# Patient Record
Sex: MALE | Race: WHITE | NOT HISPANIC OR LATINO | Employment: FULL TIME | URBAN - METROPOLITAN AREA
[De-identification: names, ages, dates, MRNs, and addresses within clinical notes are randomized per-mention and may not be internally consistent; named-entity substitution may affect disease eponyms.]

---

## 2017-04-25 ENCOUNTER — MYC MEDICAL ADVICE (OUTPATIENT)
Dept: FAMILY MEDICINE | Facility: CLINIC | Age: 52
End: 2017-04-25

## 2017-04-26 ENCOUNTER — OFFICE VISIT (OUTPATIENT)
Dept: FAMILY MEDICINE | Facility: CLINIC | Age: 52
End: 2017-04-26

## 2017-04-26 ENCOUNTER — TELEPHONE (OUTPATIENT)
Dept: FAMILY MEDICINE | Facility: CLINIC | Age: 52
End: 2017-04-26

## 2017-04-26 VITALS
SYSTOLIC BLOOD PRESSURE: 110 MMHG | HEART RATE: 50 BPM | DIASTOLIC BLOOD PRESSURE: 74 MMHG | BODY MASS INDEX: 29.65 KG/M2 | TEMPERATURE: 97.8 F | WEIGHT: 215.6 LBS | OXYGEN SATURATION: 96 %

## 2017-04-26 DIAGNOSIS — L30.9 ECZEMA, UNSPECIFIED TYPE: ICD-10-CM

## 2017-04-26 DIAGNOSIS — M25.50 MULTIPLE JOINT PAIN: ICD-10-CM

## 2017-04-26 DIAGNOSIS — G89.29 CHRONIC BILATERAL LOW BACK PAIN WITHOUT SCIATICA: Primary | ICD-10-CM

## 2017-04-26 DIAGNOSIS — R07.9 CHRONIC CHEST PAIN: ICD-10-CM

## 2017-04-26 DIAGNOSIS — M54.50 CHRONIC BILATERAL LOW BACK PAIN WITHOUT SCIATICA: Primary | ICD-10-CM

## 2017-04-26 DIAGNOSIS — G89.29 CHRONIC CHEST PAIN: ICD-10-CM

## 2017-04-26 DIAGNOSIS — Z12.11 SPECIAL SCREENING FOR MALIGNANT NEOPLASMS, COLON: ICD-10-CM

## 2017-04-26 PROCEDURE — 99214 OFFICE O/P EST MOD 30 MIN: CPT | Performed by: PHYSICIAN ASSISTANT

## 2017-04-26 RX ORDER — CHLORAL HYDRATE 500 MG
2 CAPSULE ORAL DAILY
COMMUNITY
End: 2019-03-08

## 2017-04-26 RX ORDER — PSYLLIUM SEED (WITH DEXTROSE)
2 POWDER (GRAM) ORAL DAILY
COMMUNITY
End: 2019-03-08

## 2017-04-26 RX ORDER — CYCLOBENZAPRINE HCL 5 MG
TABLET ORAL
Qty: 20 TABLET | Refills: 0 | Status: SHIPPED | OUTPATIENT
Start: 2017-04-26 | End: 2017-08-14

## 2017-04-26 NOTE — MR AVS SNAPSHOT
After Visit Summary   4/26/2017    Dmitriy Saunders    MRN: [id number]           Patient Information     Date Of Birth          1965        Visit Information        Provider Department      4/26/2017 10:15 AM Celeste Lopez PA [facility]        Today's Diagnoses     Chronic bilateral low back pain without sciatica    -  1    Chronic chest pain        Multiple joint pain        Special screening for malignant neoplasms, colon        Eczema, unspecified type           Follow-ups after your visit        Additional Services     GASTROENTEROLOGY ADULT REF CONSULT ONLY       Preferred Location: [facility] [phone]      Please be aware that coverage of these services is subject to the terms and limitations of your health insurance plan.  Call member services at your health plan with any benefit or coverage questions.  Any procedures must be performed at a [facility] facility OR coordinated by your clinic's referral office.    Please bring the following with you to your appointment:    (1) Any X-Rays, CTs or MRIs which have been performed.  Contact the facility where they were done to arrange for  prior to your scheduled appointment.    (2) List of current medications   (3) This referral request   (4) Any documents/labs given to you for this referral            Other Specialty Referral       Your provider has referred you to:   [facility]  Please call to schedule an appointment:  [phone]   Check with your insurance provider for coverage.      Please call and schedule your own appointment.     Please be aware that coverage of these services is subject to the terms and limitations of your health insurance plan.  Call member services at your health plan with any benefit or coverage questions.      Please inform our clinic Referral Specialist of any tests that you may have already completed.    Please bring the following with you to your  appointment:    (1) Any X-Rays, CTs or MRIs which have been performed.  Contact the facility where they were done to arrange for  prior to your scheduled appointment.  Any new CT, MRI or other procedures ordered by your specialist must be performed at a [facility] facility or coordinated by your clinic's referral office.    (2) List of current medications   (3) This referral request   (4) Any documents/labs given to you for this referral                  Who to contact     If you have questions or need follow up information about today's clinic visit or your schedule please contact [facility] directly at [phone].  Normal or non-critical lab and imaging results will be communicated to you by MyChart, letter or phone within 4 business days after the clinic has received the results. If you do not hear from us within 7 days, please contact the clinic through "[employer]hart or phone. If you have a critical or abnormal lab result, we will notify you by phone as soon as possible.  Submit refill requests through [employer] or call your pharmacy and they will forward the refill request to us. Please allow 3 business days for your refill to be completed.          Additional Information About Your Visit        "[employer]hart Information     [employer] gives you secure access to your electronic health record. If you see a primary care provider, you can also send messages to your care team and make appointments. If you have questions, please call your primary care clinic.  If you do not have a primary care provider, please call [phone] and they will assist you.        Care EveryWhere ID     This is your Care EveryWhere ID. This could be used by other organizations to access your [facility] medical records  [id number]        Your Vitals Were     Pulse Temperature Pulse Oximetry BMI (Body Mass Index)          50 97.8  F (36.6  C) (Oral) 96% 29.65 kg/m2         Blood Pressure from Last 3 Encounters:   04/26/17  110/74   07/26/16 110/60   06/30/16 120/70    Weight from Last 3 Encounters:   04/26/17 97.8 kg (215 lb 9.6 oz)   07/26/16 98.7 kg (217 lb 9.6 oz)   06/30/16 98.2 kg (216 lb 9.6 oz)              We Performed the Following     GASTROENTEROLOGY ADULT REF CONSULT ONLY     Other Specialty Referral          Today's Medication Changes          These changes are accurate as of: 4/26/17 11:59 PM.  If you have any questions, ask your nurse or doctor.               Start taking these medicines.        Dose/Directions    cyclobenzaprine 5 MG tablet   Commonly known as:  FLEXERIL   Used for:  Chronic bilateral low back pain without sciatica   Started by:  Celeste Lopez PA        Take one as needed for back pain   Quantity:  20 tablet   Refills:  0            Where to get your medicines      These medications were sent to [facility] [address] [place] [address] E AT [facility] [address]  [address] [place] [address] E, [facility] [address]     Phone:  [phone]     cyclobenzaprine 5 MG tablet                Primary Care Provider Office Phone # Fax #    DIANA Carballo [phone]       [facility]  [address]  [facility] [address]        Thank you!     Thank you for choosing [facility]  for your care. Our goal is always to provide you with excellent care. Hearing back from our patients is one way we can continue to improve our services. Please take a few minutes to complete the written survey that you may receive in the mail after your visit with us. Thank you!             Your Updated Medication List - Protect others around you: Learn how to safely use, store and throw away your medicines at www.disposemymeds.org.          This list is accurate as of: 4/26/17 11:59 PM.  Always use your most recent med list.                   Brand Name Dispense Instructions for use    albuterol 108 (90 BASE) MCG/ACT Inhaler    PROAIR  HFA/PROVENTIL HFA/VENTOLIN HFA    1 Inhaler    Inhale 2 puffs into the lungs every 6 hours as needed for shortness of breath / dyspnea or wheezing       cyclobenzaprine 5 MG tablet    FLEXERIL    20 tablet    Take one as needed for back pain       fish oil-omega-3 fatty acids 1000 MG capsule      Take 2 g by mouth daily       hydrocortisone 0.2 % cream    WESTCORT    60 g    Apply sparingly to affected area two times daily as needed.       NEW MED          psyllium Wafr      Take 2 Wafers by mouth daily       RHINOCORT NA

## 2017-04-26 NOTE — NURSING NOTE
Dmitriy is here for med check and Hepatitis C screening    Pre-Visit Screening :  Immunizations : up to date  Colon Screening : is due and to be scheduled by patient for later completion  Asthma Action Test/Plan : CLEOPATRA  PHQ9/GAD7 :  NA  Pulse - regular  My Chart - accepts    CLASSIFICATION OF OVERWEIGHT AND OBESITY BY BMI                         Obesity Class           BMI(kg/m2)  Underweight                                    < 18.5  Normal                                         18.5-24.9  Overweight                                     25.0-29.9  OBESITY                     I                  30.0-34.9                              II                 35.0-39.9  EXTREME OBESITY             III                >40                             Patient's  BMI Body mass index is 29.65 kg/(m^2).  http://hin.nhlbi.nih.gov/menuplanner/menu.cgi  Questioned patient about current smoking habits.  Pt. has never smoked.

## 2017-04-26 NOTE — PROGRESS NOTES
SUBJECTIVE:                                                    Dmitriy Saunders is a 51 year old male who presents to clinic today for the following health issues:    1. Was referred to rhematology last year for mult. Joint pain, never went.  Right ankle pain last year, left ankle pain this year.  Chronic pain of hips and shoulders  Niece with RA    2. Chest pain for 4 years  Saw Cardiologist in [place]  Had coronary angiogram 6 months ago which was normal.   Comes and goes. No assoc. SOB. Feels like it may happen more when he is using his chain saw.     3. Due for colonoscopy    4. Fish Oil, Tumeric OTC    5. Eczema -  Only when eating gluten.  His sister has celiac disease.     6. Back pain for many years.  Previously seen PNB and did well, wanting to be seen again            Labs reviewed in EPIC  BP Readings from Last 3 Encounters:   04/26/17 110/74   07/26/16 110/60   06/30/16 120/70    Wt Readings from Last 3 Encounters:   04/26/17 97.8 kg (215 lb 9.6 oz)   07/26/16 98.7 kg (217 lb 9.6 oz)   06/30/16 98.2 kg (216 lb 9.6 oz)                  Patient Active Problem List   Diagnosis     Health Care Home     ACP (advance care planning)     Past Surgical History:   Procedure Laterality Date     ENT SURGERY       ORTHOPEDIC SURGERY         Social History   Substance Use Topics     Smoking status: Never Smoker     Smokeless tobacco: Never Used     Alcohol use No     No family history on file.      Current Outpatient Prescriptions   Medication Sig Dispense Refill     fish oil-omega-3 fatty acids 1000 MG capsule Take 2 g by mouth daily       psyllium (METAMUCIL) WAFR Take 2 Wafers by mouth daily       Budesonide (RHINOCORT NA)        NEW MED        cyclobenzaprine (FLEXERIL) 5 MG tablet Take one as needed for back pain 20 tablet 0     hydrocortisone (WESTCORT) 0.2 % cream Apply sparingly to affected area two times daily as needed. 60 g 0     albuterol (PROAIR HFA, PROVENTIL HFA, VENTOLIN HFA) 108 (90 BASE) MCG/ACT  inhaler Inhale 2 puffs into the lungs every 6 hours as needed for shortness of breath / dyspnea or wheezing 1 Inhaler 1     Allergies   Allergen Reactions     Erythromycin      Recent Labs   Lab Test  10/21/15   1718  07/14/15   1355  05/28/10   1245   ALT  25   --   29   CR  1.30  1.47*  1.46*   GFRESTIMATED  64  51*  52*   GFRESTBLACK   --   61  63   POTASSIUM  3.8  4.2  4.5            OBJECTIVE:                                                    /74 (BP Location: Left arm, Patient Position: Chair, Cuff Size: Adult Large)  Pulse 50  Temp 97.8  F (36.6  C) (Oral)  Wt 97.8 kg (215 lb 9.6 oz)  SpO2 96%  BMI 29.65 kg/m2   Body mass index is 29.65 kg/(m^2).   GENERAL: healthy, alert, well nourished, well hydrated, no distress  HENT: ear canals- normal; TMs- normal; Nose- normal; Mouth- no ulcers, no lesions  NECK: no tenderness, no adenopathy, no asymmetry, no masses, no stiffness; thyroid- normal to palpation  RESP: lungs clear to auscultation - no rales, no rhonchi, no wheezes  CV: regular rates and rhythm, normal S1 S2, no S3 or S4 and no murmur, no click or rub -      Shoulders - pt able to abduct Full however has to pronate/supinate wrist to go beyond 90 degrees abduction.    Gait is normal.     Back:  Skin without ecchymosis, erythema or swelling.  Thoracic and lumbar spine non-tender to palpation.  SI Joints:  Right and left SI joints non-tender  ROM:  Normal flexion, extension, rotation, lateral bending  Strength - Full strength hip flexors, knee extensors/flexors and ankles    Skin: no eczematous changes present today       ASSESSMENT/PLAN:                                                    1. Chronic chest pain  Pt to keep journal RTC 1 month to re-asses  No indication for CXR today, most likely Musculoskeletal       2. Multiple joint pain  Pt to see Rheum    3. Chronic bilateral low back pain without sciatica    - Other Specialty Referral  - cyclobenzaprine (FLEXERIL) 5 MG tablet; Take one as  needed for back pain  Dispense: 20 tablet; Refill: 0    4. Special screening for malignant neoplasms, colon    - GASTROENTEROLOGY ADULT REF CONSULT ONLY    5. Eczema, unspecified type  ? Celiac - will ask gastro for bx when he gets colonoscopy      Risks, benefits and alternatives of treatments discussed. Plan agreed on.      Follow up with Provider - kang [place] in 6 months     DIANA Carballo  [facility]

## 2017-05-19 ENCOUNTER — TRANSFERRED RECORDS (OUTPATIENT)
Dept: FAMILY MEDICINE | Facility: CLINIC | Age: 52
End: 2017-05-19

## 2017-05-25 PROBLEM — R91.8 PULMONARY NODULES: Status: ACTIVE | Noted: 2017-05-25

## 2017-08-14 ENCOUNTER — OFFICE VISIT (OUTPATIENT)
Dept: FAMILY MEDICINE | Facility: CLINIC | Age: 52
End: 2017-08-14

## 2017-08-14 VITALS
WEIGHT: 204 LBS | HEIGHT: 71 IN | OXYGEN SATURATION: 98 % | TEMPERATURE: 98.2 F | HEART RATE: 78 BPM | BODY MASS INDEX: 28.56 KG/M2 | DIASTOLIC BLOOD PRESSURE: 70 MMHG | SYSTOLIC BLOOD PRESSURE: 110 MMHG

## 2017-08-14 DIAGNOSIS — R07.0 THROAT PAIN: Primary | ICD-10-CM

## 2017-08-14 LAB — S PYO AG THROAT QL IA.RAPID: NORMAL

## 2017-08-14 PROCEDURE — 87070 CULTURE OTHR SPECIMN AEROBIC: CPT | Performed by: PHYSICIAN ASSISTANT

## 2017-08-14 PROCEDURE — 87880 STREP A ASSAY W/OPTIC: CPT | Performed by: PHYSICIAN ASSISTANT

## 2017-08-14 PROCEDURE — 99213 OFFICE O/P EST LOW 20 MIN: CPT | Performed by: PHYSICIAN ASSISTANT

## 2017-08-14 NOTE — PROGRESS NOTES
SUBJECTIVE:                                                    Dmitriy Saunders is a 52 year old male who presents to clinic today for the following health issues:    Raul is here with sore throat.  Sx started Friday. Thursday felt lightheaded with standing.  [place]ent to the cabin this weekend.    Came home today, has been sleeping today.  No fevers.  Cough began today.     Sick contacts: none.    OTC: nothing.              Labs reviewed in EPIC  BP Readings from Last 3 Encounters:   08/14/17 110/70   04/26/17 110/74   07/26/16 110/60    Wt Readings from Last 3 Encounters:   08/14/17 92.5 kg (204 lb)   04/26/17 97.8 kg (215 lb 9.6 oz)   07/26/16 98.7 kg (217 lb 9.6 oz)                  Patient Active Problem List   Diagnosis     Health Care Home     ACP (advance care planning)     Pulmonary nodules Dec 2016 CT - see scanned doc     Past Surgical History:   Procedure Laterality Date     colonoscopy  2017    repeat 2027     ENT SURGERY       ORTHOPEDIC SURGERY         Social History   Substance Use Topics     Smoking status: Never Smoker     Smokeless tobacco: Never Used     Alcohol use No     No family history on file.      Current Outpatient Prescriptions   Medication Sig Dispense Refill     fish oil-omega-3 fatty acids 1000 MG capsule Take 2 g by mouth daily       psyllium (METAMUCIL) WAFR Take 2 Wafers by mouth daily       Budesonide (RHINOCORT NA)        hydrocortisone (WESTCORT) 0.2 % cream Apply sparingly to affected area two times daily as needed. 60 g 0     albuterol (PROAIR HFA, PROVENTIL HFA, VENTOLIN HFA) 108 (90 BASE) MCG/ACT inhaler Inhale 2 puffs into the lungs every 6 hours as needed for shortness of breath / dyspnea or wheezing 1 Inhaler 1     NEW MED        Allergies   Allergen Reactions     Erythromycin      Recent Labs   Lab Test  10/21/15   1718  07/14/15   1355  05/28/10   1245   ALT  25   --   29   CR  1.30  1.47*  1.46*   GFRESTIMATED  64  51*  52*   GFRESTBLACK   --   61  63   POTASSIUM  3.8   "4.2  4.5              OBJECTIVE:   /70 (BP Location: Left arm, Cuff Size: Adult Large)  Pulse 78  Temp 98.2  F (36.8  C) (Oral)  Ht 1.81 m (5' 11.25\")  Wt 92.5 kg (204 lb)  SpO2 98%  BMI 28.25 kg/m2   Body mass index is 28.25 kg/(m^2).       GENERAL: healthy, alert and no distress  HEAD: Normocephalic, atraumatic  EYES: Eyes grossly normal to inspection, extraocular movements - intact in all directions. No discharge  EARS: canals- normal; TMs- normal  NOSE:  Nasal mucosa pink and moist. No abnormal discharge.  MOUTH:   Mucous membranes moist.  Pharynx  Is erythematous, no exudates. No ulcers, no lesions  NECK: no tenderness, no adenopathy,  no masses, no stiffness; thyroid- normal to palpation  RESP: lungs clear to auscultation - no rales, no rhonchi, no wheezes  CV: regular rates and rhythm, normal S1 S2, no S3 or S4 and no murmur, no click or rub   MS: extremities- no gross deformities noted  NEURO: strength and tone- normal, sensory exam- grossly normal, mentation- intact, speech- normal          ASSESSMENT/PLAN:                                                        ICD-10-CM    1. Throat pain R07.0 RAPID STREP (BFP)     THROAT CULTURE (BFP)       Recommend Ibuprofen or Tylenol as tolerated for pain relief. Chloraseptic, cough drops advised.   RTC with worsening sore throat, fevers, difficulty swallowing. Strep Culture pending, will call if positive.        Celeste Lopez PA-C  [facility]    "

## 2017-08-14 NOTE — MR AVS SNAPSHOT
"              After Visit Summary   8/14/2017    Dmitriy Saunders    MRN: [id number]           Patient Information     Date Of Birth          1965        Visit Information        Provider Department      8/14/2017 2:45 PM Celeste Lopez PA [facility]        Today's Diagnoses     Throat pain    -  1       Follow-ups after your visit        Who to contact     If you have questions or need follow up information about today's clinic visit or your schedule please contact [facility] directly at [phone].  Normal or non-critical lab and imaging results will be communicated to you by [employer], letter or phone within 4 business days after the clinic has received the results. If you do not hear from us within 7 days, please contact the clinic through [employer] or phone. If you have a critical or abnormal lab result, we will notify you by phone as soon as possible.  Submit refill requests through "[employer] or call your pharmacy and they will forward the refill request to us. Please allow 3 business days for your refill to be completed.          Additional Information About Your Visit        MyChart Information     "[employer] gives you secure access to your electronic health record. If you see a primary care provider, you can also send messages to your care team and make appointments. If you have questions, please call your primary care clinic.  If you do not have a primary care provider, please call [phone] and they will assist you.        Care EveryWhere ID     This is your Care EveryWhere ID. This could be used by other organizations to access your [facility] medical records  [id number]        Your Vitals Were     Pulse Temperature Height Pulse Oximetry BMI (Body Mass Index)       78 98.2  F (36.8  C) (Oral) 1.81 m (5' 11.25\") 98% 28.25 kg/m2        Blood Pressure from Last 3 Encounters:   08/14/17 110/70   04/26/17 110/74   07/26/16 110/60    Weight from " Last 3 Encounters:   08/14/17 92.5 kg (204 lb)   04/26/17 97.8 kg (215 lb 9.6 oz)   07/26/16 98.7 kg (217 lb 9.6 oz)              We Performed the Following     RAPID STREP (BFP)     THROAT CULTURE (BFP)        Primary Care Provider Office Phone # Fax #    Celesteanna TorresDIANA Patel [phone] [address]  [facility] [address]        Equal Access to Services     OG RIZO : Hadii aad ku hadasho Soomaali, waaxda luqadaha, qaybta kaalmada adeegyada, waxay idiin hayaan adeeg kharakelvin holman . So [facility] [phone].    ATENCIÓN: Si habla español, tiene a andrews disposición servicios gratuitos de asistencia lingüística. Llame al [phone].    We comply with applicable federal civil rights laws and [place] laws. We do not discriminate on the basis of race, color, national origin, age, disability sex, sexual orientation or gender identity.            Thank you!     Thank you for choosing [facility]  for your care. Our goal is always to provide you with excellent care. Hearing back from our patients is one way we can continue to improve our services. Please take a few minutes to complete the written survey that you may receive in the mail after your visit with us. Thank you!             Your Updated Medication List - Protect others around you: Learn how to safely use, store and throw away your medicines at www.disposemymeds.org.          This list is accurate as of: 8/14/17  3:05 PM.  Always use your most recent med list.                   Brand Name Dispense Instructions for use Diagnosis    albuterol 108 (90 BASE) MCG/ACT Inhaler    PROAIR HFA/PROVENTIL HFA/VENTOLIN HFA    1 Inhaler    Inhale 2 puffs into the lungs every 6 hours as needed for shortness of breath / dyspnea or wheezing    Mild intermittent asthma, uncomplicated       fish oil-omega-3 fatty acids 1000 MG capsule      Take 2 g by mouth daily        hydrocortisone 0.2 % cream    WESTCORT    60 g     Apply sparingly to affected area two times daily as needed.    Eczema, unspecified type       NEW MED           psyllium Wafr      Take 2 Wafers by mouth daily        RHINOCORT NA

## 2017-08-16 LAB — THROAT CULTURE: NORMAL

## 2018-03-08 ENCOUNTER — TRANSFERRED RECORDS (OUTPATIENT)
Dept: FAMILY MEDICINE | Facility: CLINIC | Age: 53
End: 2018-03-08

## 2019-02-21 ENCOUNTER — TRANSFERRED RECORDS (OUTPATIENT)
Dept: FAMILY MEDICINE | Facility: CLINIC | Age: 54
End: 2019-02-21

## 2019-03-08 ENCOUNTER — OFFICE VISIT (OUTPATIENT)
Dept: FAMILY MEDICINE | Facility: CLINIC | Age: 54
End: 2019-03-08

## 2019-03-08 VITALS
SYSTOLIC BLOOD PRESSURE: 112 MMHG | HEART RATE: 56 BPM | TEMPERATURE: 97.9 F | DIASTOLIC BLOOD PRESSURE: 64 MMHG | HEIGHT: 71 IN | BODY MASS INDEX: 30.18 KG/M2 | WEIGHT: 215.6 LBS | RESPIRATION RATE: 20 BRPM

## 2019-03-08 DIAGNOSIS — S91.312A LACERATION OF LEFT HEEL, INITIAL ENCOUNTER: Primary | ICD-10-CM

## 2019-03-08 DIAGNOSIS — Z23 NEED FOR VACCINATION: ICD-10-CM

## 2019-03-08 PROCEDURE — 90686 IIV4 VACC NO PRSV 0.5 ML IM: CPT | Performed by: FAMILY MEDICINE

## 2019-03-08 PROCEDURE — 99213 OFFICE O/P EST LOW 20 MIN: CPT | Mod: 25 | Performed by: FAMILY MEDICINE

## 2019-03-08 PROCEDURE — 90472 IMMUNIZATION ADMIN EACH ADD: CPT | Performed by: FAMILY MEDICINE

## 2019-03-08 PROCEDURE — 90714 TD VACC NO PRESV 7 YRS+ IM: CPT | Performed by: FAMILY MEDICINE

## 2019-03-08 PROCEDURE — 90471 IMMUNIZATION ADMIN: CPT | Performed by: FAMILY MEDICINE

## 2019-03-08 RX ORDER — CEPHALEXIN 500 MG/1
500 TABLET ORAL 4 TIMES DAILY
Qty: 28 TABLET | Refills: 0 | Status: SHIPPED | OUTPATIENT
Start: 2019-03-08 | End: 2019-04-23

## 2019-03-08 ASSESSMENT — MIFFLIN-ST. JEOR: SCORE: 1849.05

## 2019-03-08 NOTE — PROGRESS NOTES
"SUBJECTIVE:  53 year old male presents with the following concern:    Left heel pain- two days duration  Her reports stepping on \"something\" [place] the beach, which caused a laceration on his heel.  He denies drainage from the laceration  Offered an x-ray of his heel- he defers- he does not feel he likely has a foreign body      Other concerns: he has back problems,-  He complains that his legs ache-    Last seen in our office a couple of years ago.    Patient Active Problem List   Diagnosis     Health Care Home     ACP (advance care planning)     Pulmonary nodules Dec 2016 CT - see scanned doc     Past Surgical History:   Procedure Laterality Date     colonoscopy  2017    repeat 2027     ENT SURGERY       ORTHOPEDIC SURGERY       Current Outpatient Medications   Medication     Budesonide (RHINOCORT NA)     cephalexin 500 MG tablet     diazepam (VALIUM) 5 MG tablet     No current facility-administered medications for this visit.       ROS: 7 point ROS neg other than the symptoms noted above in the HPI.    OBJECTIVE:  /64 (BP Location: Right arm)   Pulse 56   Temp 97.9  F (36.6  C) (Oral)   Resp 20   Ht 1.81 m (5' 11.25\")   Wt 97.8 kg (215 lb 9.6 oz)   BMI 29.86 kg/m    Superficial 1 cm laceration on his left heel-  No redness  No drainage  Mild tenderness to palpation- heel is tender when he bears weight     Assessment   Superficial laceration , heel with secondary pain      PLAN:  Suggested limited course of antibiotics with the vague history   Foot soaks  Tetanus updated  Call or return to clinic prn if these symtoms worsen, fail to improve as anticipated, or if new symptoms develop.      "

## 2019-03-09 ENCOUNTER — APPOINTMENT (OUTPATIENT)
Dept: GENERAL RADIOLOGY | Facility: CLINIC | Age: 54
End: 2019-03-09
Attending: EMERGENCY MEDICINE
Payer: COMMERCIAL

## 2019-03-09 ENCOUNTER — HOSPITAL ENCOUNTER (EMERGENCY)
Facility: CLINIC | Age: 54
Discharge: HOME | End: 2019-03-10
Attending: EMERGENCY MEDICINE | Admitting: EMERGENCY MEDICINE
Payer: COMMERCIAL

## 2019-03-09 DIAGNOSIS — M79.10 MYALGIA: ICD-10-CM

## 2019-03-09 DIAGNOSIS — R07.9 CHEST PAIN, UNSPECIFIED TYPE: ICD-10-CM

## 2019-03-09 DIAGNOSIS — N28.9 RENAL INSUFFICIENCY: ICD-10-CM

## 2019-03-09 LAB
ANION GAP SERPL CALCULATED.3IONS-SCNC: 4 MMOL/L (ref 3–14)
BUN SERPL-MCNC: 17 MG/DL (ref 7–30)
CALCIUM SERPL-MCNC: 8.4 MG/DL (ref 8.5–10.1)
CHLORIDE SERPL-SCNC: 106 MMOL/L (ref 94–109)
CK SERPL-CCNC: 265 U/L (ref 30–300)
CO2 SERPL-SCNC: 29 MMOL/L (ref 20–32)
CREAT SERPL-MCNC: 1.72 MG/DL (ref 0.66–1.25)
D DIMER PPP FEU-MCNC: 0.4 UG/ML FEU (ref 0–0.5)
ERYTHROCYTE [DISTWIDTH] IN BLOOD BY AUTOMATED COUNT: 12.6 % (ref 10–15)
GFR SERPL CREATININE-BSD FRML MDRD: 44 ML/MIN/{1.73_M2}
GLUCOSE SERPL-MCNC: 100 MG/DL (ref 70–99)
HCT VFR BLD AUTO: 42.4 % (ref 40–53)
HGB BLD-MCNC: 13.9 G/DL (ref 13.3–17.7)
MCH RBC QN AUTO: 28.3 PG (ref 26.5–33)
MCHC RBC AUTO-ENTMCNC: 32.8 G/DL (ref 31.5–36.5)
MCV RBC AUTO: 86 FL (ref 78–100)
PLATELET # BLD AUTO: 124 10E9/L (ref 150–450)
POTASSIUM SERPL-SCNC: 3.8 MMOL/L (ref 3.4–5.3)
RBC # BLD AUTO: 4.92 10E12/L (ref 4.4–5.9)
SODIUM SERPL-SCNC: 139 MMOL/L (ref 133–144)
TROPONIN I SERPL-MCNC: <0.015 UG/L (ref 0–0.04)
WBC # BLD AUTO: 5.7 10E9/L (ref 4–11)

## 2019-03-09 PROCEDURE — 85379 FIBRIN DEGRADATION QUANT: CPT | Performed by: EMERGENCY MEDICINE

## 2019-03-09 PROCEDURE — 93005 ELECTROCARDIOGRAM TRACING: CPT

## 2019-03-09 PROCEDURE — 85027 COMPLETE CBC AUTOMATED: CPT | Performed by: EMERGENCY MEDICINE

## 2019-03-09 PROCEDURE — 80048 BASIC METABOLIC PNL TOTAL CA: CPT | Performed by: EMERGENCY MEDICINE

## 2019-03-09 PROCEDURE — 25000132 ZZH RX MED GY IP 250 OP 250 PS 637: Performed by: EMERGENCY MEDICINE

## 2019-03-09 PROCEDURE — 93005 ELECTROCARDIOGRAM TRACING: CPT | Mod: 76

## 2019-03-09 PROCEDURE — 99285 EMERGENCY DEPT VISIT HI MDM: CPT | Mod: 25

## 2019-03-09 PROCEDURE — 71046 X-RAY EXAM CHEST 2 VIEWS: CPT

## 2019-03-09 PROCEDURE — 84484 ASSAY OF TROPONIN QUANT: CPT | Mod: 91 | Performed by: EMERGENCY MEDICINE

## 2019-03-09 PROCEDURE — 82550 ASSAY OF CK (CPK): CPT | Performed by: EMERGENCY MEDICINE

## 2019-03-09 PROCEDURE — 84484 ASSAY OF TROPONIN QUANT: CPT | Performed by: EMERGENCY MEDICINE

## 2019-03-09 RX ORDER — DIAZEPAM 5 MG
5 TABLET ORAL EVERY 6 HOURS PRN
Qty: 10 TABLET | Refills: 0 | Status: SHIPPED | OUTPATIENT
Start: 2019-03-09 | End: 2019-04-23

## 2019-03-09 RX ORDER — ASPIRIN 325 MG
325 TABLET ORAL ONCE
Status: COMPLETED | OUTPATIENT
Start: 2019-03-09 | End: 2019-03-09

## 2019-03-09 RX ORDER — NITROGLYCERIN 0.4 MG/1
0.4 TABLET SUBLINGUAL EVERY 5 MIN PRN
Status: DISCONTINUED | OUTPATIENT
Start: 2019-03-09 | End: 2019-03-10 | Stop reason: HOSPADM

## 2019-03-09 RX ADMIN — ASPIRIN 325 MG ORAL TABLET 325 MG: 325 PILL ORAL at 21:56

## 2019-03-09 ASSESSMENT — ENCOUNTER SYMPTOMS
COUGH: 0
NAUSEA: 0
CHILLS: 1
SHORTNESS OF BREATH: 0
MYALGIAS: 1
WOUND: 1

## 2019-03-09 NOTE — ED AVS SNAPSHOT
[facility]  [address]  [facility] [address]  Phone:  [phone]  Fax:  [phone]                                    Dmitriy Saunders   MRN: [id number]    Department:  [facility]   Date of Visit:  3/9/2019           After Visit Summary Signature Page    I have received my discharge instructions, and my questions have been answered. I have discussed any challenges I see with this plan with the nurse or doctor.    ..........................................................................................................................................  Patient/Patient Representative Signature      ..........................................................................................................................................  Patient Representative Print Name and Relationship to Patient    ..................................................               ................................................  Date                                   Time    ..........................................................................................................................................  Reviewed by Signature/Title    ...................................................              ..............................................  Date                                               Time          22EPIC Rev 08/18

## 2019-03-10 VITALS
HEART RATE: 70 BPM | TEMPERATURE: 98.6 F | RESPIRATION RATE: 9 BRPM | OXYGEN SATURATION: 98 % | SYSTOLIC BLOOD PRESSURE: 122 MMHG | DIASTOLIC BLOOD PRESSURE: 71 MMHG

## 2019-03-10 LAB — TROPONIN I SERPL-MCNC: <0.015 UG/L (ref 0–0.04)

## 2019-03-10 NOTE — ED TRIAGE NOTES
Pt aox4, abcs intact. Pt states that he has been having left leg pain since December. Woke up PTA which sharp chest pain, denies SOB. Took aleve at dinner .

## 2019-03-10 NOTE — ED PROVIDER NOTES
"  History     Chief Complaint:  Chest Pain and Thigh Myalgia     The history is provided by the patient.      Dmitriy Saunders is a 53 year old male who presents to the emergency department today for evaluation of chest pain. He reports onset of left sided chest pain that feels musculoskeletal in origin approximately 15 minutes prior to arrival when in bed. He denies any exacerbating factors. The pain is non-radiating and constant since onset. Additionally, patient complains of worsened bilateral thigh pain (R>L) that feels like they have been \"beat with a hammer\" for the last few days. He asserts this problem has been present since December of 2018 and endorses relief from heating pads.     He also notes acquiring a flu and tetanus shot yesterday after a weeklong stay in [place]. Currently he reports he is on a course of Cephalexin for a laceration on his foot after picking off his callous 10 days ago. Patient reports ocean exposure to this wound.  Currently, he states it feels like he has the flu due to body aches and endorses frequent chills. Additionally, he affirms worsening of his thigh pain when he is cold and sitting but reports relief of his discomfort with standing. He notes use of Naproxen in the morning and Motrin at night. He denies any hemoptysis, referred pain, shortness of breath, nausea, leg swelling, or personal history of PE/DVT, recent surgery, cancer, street drug, and tobacco use.     Allergies:  Erythromycin     Medications:    Cephalexin     Past Medical History:    Pulmonary nodule    Past Surgical History:    ENT surgery  Orthopedic surgery    Family History:    History reviewed. No pertinent family history.     Social History:  The patient was accompanied to the ED by wife.  Smoking Status: Never Smoker  Smokeless Tobacco: Never Used  Alcohol Use: Negative  Marital Status:       Review of Systems   Constitutional: Positive for chills.   Respiratory: Negative for cough (w/ blood) " and shortness of breath.    Cardiovascular: Positive for chest pain. Negative for leg swelling.   Gastrointestinal: Negative for nausea.   Musculoskeletal: Positive for myalgias (Thigh).   Skin: Positive for wound.   All other systems reviewed and are negative.      Physical Exam     Patient Vitals for the past 24 hrs:   BP Temp src Heart Rate Resp SpO2   03/09/19 2127 148/89 Oral 81 16 99 %     Physical Exam    General:   Pleasant, age appropriate.  HEENT:    Oropharynx is moist, without lesions or trismus.  Eyes:    Conjunctiva normal  Neck:    Supple, no meningismus.     CV:     Regular rate and rhythm.      No murmurs, rubs or gallops.       No JVD or unilateral leg swelling.       2+ radial and DP pulses bilateral.       No lower extremity edema.  PULM:    Clear to auscultation bilateral.       No respiratory distress.      Good air exchange.     No rales or wheezing.     No stridor.  ABD:    Soft, non-tender, non-distended.       No pulsatile masses.       No rebound, guarding or rigidity.  MSK:     Lower extremities:      Compartments are soft and compressible      No warmth or erythema      No reproducible tenderness      Full passive ROM of the joints      No edema  LYMPH:   No cervical lymphadenopathy.  NEURO:   Alert. Good muscle tone, no atrophy.     Strength and sensation intact to lower extremities     Left foot:      1 cm well healing laceration to heel       No surrounding erythema, warmth or discharge   Skin:    Warm, dry and intact.    Psych:    Mood is good and affect is appropriate.      HEART SCORE    History:   Highly suspicious          Moderately suspicious         Slightly suspicious     0    ECG:   Significant ST depression         Non-specific repolarization abnormality       Normal       0    Age:   > 65            45-65       1     < 45           Risk Factors:  3 or more           1-2            No risk factors      0    Troponin:   > 3x normal limit     1-3x normal limit     < normal  limit      0    Total:           1        Emergency Department Course     ECG:  ECG taken at 2129, ECG read at 2131  Normal sinus rhythm  Normal ECG  Rate 75 bpm. MA interval 166 ms. QRS duration 90 ms. QT/QTc 356/397 ms. P-R-T axes 70 63 55.    Imaging:  Radiology findings were communicated with the patient who voiced understanding of the findings.    Chest XR,  PA & LAT  PA and lateral views of the chest. Lungs are clear. Heart  is normal in size. No effusions are evident. No pneumothorax.  Degenerative mid to lower thoracic spine changes are noted.  Reading per radiology    Laboratory:  Laboratory findings were communicated with the patient who voiced understanding of the findings.    CBC: WBC 5.7, HGB 13.9,  (L)  BMP: glucose 100 (H), creatinine 1.72 (H), GFR 44 (L), Ca 8.4 (L) o/w WNL   CK total: 265  D dimer quant: 0.4  Troponin (Collected 2140): <0.015  Troponin #2: Pending    Interventions:  2156: Aspirin 325 mg PO    Emergency Department Course:    2123 Nursing notes and vitals reviewed.    2124 I performed an exam of the patient as documented above.     2140 IV was inserted and blood was drawn for laboratory testing, results above.    2219 Per nurse, patient's chest pain is gone but is still complaining of thigh pain.     2238 The patient was sent for a Chest x-ray while in the emergency department, results above.     2305 Patient rechecked and updated.     I personally reviewed the imaging and laboratory results with the patient and answered all related questions prior to discharge.    Impression & Plan      Medical Decision Making:  Dmitriy Saunders is a 53 year old male who presents to the emergency department today for evaluation of chest pain and bilateral thigh pain.  In regards to his chest pain, he was evaluated for ACS.  EKG shows no ischemic changes.  His chest pain resolved without intervention.  Troponin is within normal limits.  2-hour delta troponin will be undertaken as patient  presented shortly after onset of pain.  He has a HEART score of 1 thus is safe for discharge home if delta troponin is reassuring.  Patient will be changed over to oncoming provider, and if negative discharge home with close follow-up with PCP.    He was also evaluated for pulmonary embolus.  His only risk factor was recent immobilization with travel to [place].  D-dimer is negative.  He has no features concerning for aortic dissection, aortic aneurysm.    Patient also had significant myalgias to the thighs bilateral for 3 months.  He has no evidence of soft tissue infection, compartment syndrome, arterial insufficiency, DVT or rhabdomyolysis.  Exact cause of his symptoms are uncertain.  Patient be given a trial of diazepam and will be referred to primary care physician for further investigation and treatment.    Diagnosis:    ICD-10-CM   1. Chest pain, unspecified type R07.9   2. Myalgia M79.10   3. Renal insufficiency N28.9     Disposition:   The patient is discharged to home.    Scribe Disclosure:  I, Yassine Torres, am serving as a scribe at 9:24 PM on 3/9/2019 to document services personally performed by Paolo Hamilton MD based on my observations and the provider's statements to me.    [facility] EMERGENCY DEPARTMENT       Paolo Hamilton MD  03/09/19 7842

## 2019-03-10 NOTE — ED NOTES
Patient states that his chest pain has stopped and that he would not like to take the NITRO. Patient states that his legs are starting to hurt again.

## 2019-03-11 ENCOUNTER — TELEPHONE (OUTPATIENT)
Dept: FAMILY MEDICINE | Facility: CLINIC | Age: 54
End: 2019-03-11

## 2019-03-11 LAB
INTERPRETATION ECG - MUSE: NORMAL
INTERPRETATION ECG - MUSE: NORMAL

## 2019-03-11 NOTE — TELEPHONE ENCOUNTER
He cephalexin has been denied and they are asking for a cheaper alternative - please send new Rx for lower cost drug    [facility] [phone]

## 2019-03-11 NOTE — TELEPHONE ENCOUNTER
Talked to patient and he states he feels good. He wants to wait a few days and flush his body with water and see if the Aleve that he was taking was causing the pain in his kidneys. He did start the antibiotics for his foot. Patient states half way through them.  Patient will call back to make appointment if he feels he needs it.

## 2019-03-11 NOTE — TELEPHONE ENCOUNTER
I see patient was in ER on Saturday  He had not yet started on antibiotics for heel pain    Needs follow up from ER for a recheck   I recommend holding on starting antibiotic until his recheck in office    Please call patient to schedule follow up in the office tomorrow

## 2019-04-23 ENCOUNTER — OFFICE VISIT (OUTPATIENT)
Dept: FAMILY MEDICINE | Facility: CLINIC | Age: 54
End: 2019-04-23

## 2019-04-23 VITALS
WEIGHT: 211.6 LBS | OXYGEN SATURATION: 98 % | TEMPERATURE: 98.6 F | HEART RATE: 56 BPM | DIASTOLIC BLOOD PRESSURE: 70 MMHG | SYSTOLIC BLOOD PRESSURE: 118 MMHG | BODY MASS INDEX: 29.31 KG/M2

## 2019-04-23 DIAGNOSIS — N28.9 RENAL INSUFFICIENCY: Primary | ICD-10-CM

## 2019-04-23 PROCEDURE — 80048 BASIC METABOLIC PNL TOTAL CA: CPT | Mod: 90 | Performed by: PHYSICIAN ASSISTANT

## 2019-04-23 PROCEDURE — 36415 COLL VENOUS BLD VENIPUNCTURE: CPT | Performed by: PHYSICIAN ASSISTANT

## 2019-04-23 PROCEDURE — 99213 OFFICE O/P EST LOW 20 MIN: CPT | Performed by: PHYSICIAN ASSISTANT

## 2019-04-23 RX ORDER — OMEGA-3/DHA/EPA/FISH OIL 60 MG-90MG
CAPSULE ORAL
COMMUNITY

## 2019-04-23 NOTE — PROGRESS NOTES
CC: Recheck Kidneys    History:  Raul was in ER 3/9/2019 with chest pain and muscle aches. This ended up being the flu. However, while he was in the ER, was found to have decrease in kidney function. Since that time, has been better about hydration and avoiding NSAIDs, as he had been taking naproxen for back pain prior to that.     Since then, no fever, sweats, chills, new back pain, urinary symptoms.    He is curious about his kidneys, as he may want to do corticosteroid injection for low back. 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     PMH, MEDICATIONS, ALLERGIES, SOCIAL AND FAMILY HISTORY in [facility] and reviewed by me personally.      ROS negative other than the symptoms noted above in the HPI.        Examination   /70 (BP Location: Left arm, Patient Position: Sitting, Cuff Size: Adult Large)   Pulse 56   Temp 98.6  F (37  C) (Oral)   Wt 96 kg (211 lb 9.6 oz)   SpO2 98%   BMI 29.31 kg/m         Constitutional: Sitting comfortably, in no acute distress. Vital signs noted  Neck:  no adenopathy, trachea midline and normal to palpation, thyroid normal to palpation  Cardiovascular: regular rate and rhythm, no murmurs, clicks, or gallops  Respiratory:  normal respiratory rate and rhythm, lungs clear to auscultation  SKIN: No jaundice/pallor/rash.   Psychiatric: mentation appears normal and affect normal/bright        A/P    ICD-10-CM    1. Renal insufficiency N28.9 VENOUS COLLECTION     BASIC METABOLIC PANEL ([facility])       DISCUSSION:  Will recheck kidneys today, and contact via [place] with results when available. If normal, I would be comfortable with him pursuing corticosteroid injection. If still mildly decreased, may need to recheck again, or recheck 1-2 weeks after corticosteroid injections. Questions answered.     follow up visit: As needed    Melissa Crowe PA-C  [facility]

## 2019-04-23 NOTE — NURSING NOTE
Raul is here today to recheck his kidney levels and discuss back pain.    Pre-visit Screening:  Immunizations:  up to date  Colonoscopy:  is up to date  Mammogram: CLEOPATRA  Asthma Action Test/Plan:  CLEOPATRA  PHQ9:  PHQ 2 done today  GAD7:  CLEOPATRA  Questioned patient about current smoking habits Pt. has never smoked.  Ok to leave detailed message on voice mail for today's visit only Yes, phone # [phone]

## 2019-04-24 LAB
BUN SERPL-MCNC: 15 MG/DL (ref 7–25)
BUN/CREATININE RATIO: 10 (CALC) (ref 6–22)
CALCIUM SERPL-MCNC: 9.3 MG/DL (ref 8.6–10.3)
CHLORIDE SERPLBLD-SCNC: 104 MMOL/L (ref 98–110)
CO2 SERPL-SCNC: 26 MMOL/L (ref 20–32)
CREAT SERPL-MCNC: 1.5 MG/DL (ref 0.7–1.33)
EGFR AFRICAN AMERICAN - QUEST: 61 ML/MIN/1.73M2
GFR SERPL CREATININE-BSD FRML MDRD: 52 ML/MIN/1.73M2
GLUCOSE - QUEST: 100 MG/DL (ref 65–99)
POTASSIUM SERPL-SCNC: 4.4 MMOL/L (ref 3.5–5.3)
SODIUM SERPL-SCNC: 139 MMOL/L (ref 135–146)

## 2019-06-06 ENCOUNTER — OFFICE VISIT (OUTPATIENT)
Dept: FAMILY MEDICINE | Facility: CLINIC | Age: 54
End: 2019-06-06

## 2019-06-06 VITALS
HEIGHT: 71 IN | HEART RATE: 86 BPM | OXYGEN SATURATION: 97 % | SYSTOLIC BLOOD PRESSURE: 118 MMHG | TEMPERATURE: 98.1 F | WEIGHT: 211.2 LBS | DIASTOLIC BLOOD PRESSURE: 78 MMHG | BODY MASS INDEX: 29.57 KG/M2

## 2019-06-06 DIAGNOSIS — M70.22 OLECRANON BURSITIS OF LEFT ELBOW: ICD-10-CM

## 2019-06-06 DIAGNOSIS — T75.3XXA MOTION SICKNESS, INITIAL ENCOUNTER: Primary | ICD-10-CM

## 2019-06-06 PROCEDURE — 99213 OFFICE O/P EST LOW 20 MIN: CPT | Performed by: PHYSICIAN ASSISTANT

## 2019-06-06 RX ORDER — DOXYCYCLINE HYCLATE 100 MG
100 TABLET ORAL 2 TIMES DAILY
Qty: 20 TABLET | Refills: 0 | Status: SHIPPED | OUTPATIENT
Start: 2019-06-06 | End: 2019-08-29

## 2019-06-06 RX ORDER — SCOLOPAMINE TRANSDERMAL SYSTEM 1 MG/1
1 PATCH, EXTENDED RELEASE TRANSDERMAL
Qty: 4 PATCH | Refills: 0 | Status: SHIPPED | OUTPATIENT
Start: 2019-06-06 | End: 2019-08-29

## 2019-06-06 ASSESSMENT — MIFFLIN-ST. JEOR: SCORE: 1824.09

## 2019-06-06 NOTE — NURSING NOTE
Raul is here today to discuss motion sickness for an upcoming cruise and a lump on his elbow.    Pre-visit Screening:  Immunizations:  Up to date  Colonoscopy:  is up to date  Mammogram: NA  Asthma Action Test/Plan:  NA  PHQ9:  NA  GAD7:  NA  Questioned patient about current smoking habits Pt. Never been a smoking.  Ok to leave detailed message on voice mail for today's visit only Yes, phone #[phone]  '

## 2019-08-29 ENCOUNTER — OFFICE VISIT (OUTPATIENT)
Dept: FAMILY MEDICINE | Facility: CLINIC | Age: 54
End: 2019-08-29

## 2019-08-29 VITALS
TEMPERATURE: 97.8 F | HEIGHT: 72 IN | WEIGHT: 214 LBS | HEART RATE: 63 BPM | BODY MASS INDEX: 28.99 KG/M2 | SYSTOLIC BLOOD PRESSURE: 118 MMHG | DIASTOLIC BLOOD PRESSURE: 68 MMHG

## 2019-08-29 DIAGNOSIS — M70.22 OLECRANON BURSITIS OF LEFT ELBOW: Primary | ICD-10-CM

## 2019-08-29 PROCEDURE — 99213 OFFICE O/P EST LOW 20 MIN: CPT | Performed by: PHYSICIAN ASSISTANT

## 2019-08-29 RX ORDER — CLINDAMYCIN HCL 300 MG
CAPSULE ORAL
Refills: 0 | COMMUNITY
Start: 2019-08-21 | End: 2019-09-19

## 2019-08-29 ASSESSMENT — MIFFLIN-ST. JEOR: SCORE: 1840.76

## 2019-08-29 NOTE — NURSING NOTE
Raul is here for left elbow bursitis flare up that is very painful.          Pre-visit Screening:  Immunizations:  up to date  Colonoscopy:  is up to date  Mammogram: NA  Asthma Action Test/Plan:  NA  PHQ9:  None  GAD7:  None  Questioned patient about current smoking habits Pt. has never smoked.  Ok to leave detailed message on voice mail for today's visit only Yes, phone # [phone]

## 2019-08-29 NOTE — PROGRESS NOTES
"CC: Skin infection, bursitis?    History:  Raul returns today with ongoing symptoms in his left elbow. He was here to see me 6/2019, when I felt he had olecranon bursitis. Was put on doxycycline for 10 days, which did help the pain and bursitis significantly improved.    Went to Dr. Savage though [facility] now 3 times- first time got antibiotic, 2nd time tried a cast, and third time it was getting better, so nothing was done at that point.     Now 1 week ago, Raul felt like elbow started getting worse again. With redness and swelling, and pain. Went to the ER 8/1/20109 and was given clindamycin. This worked well at first, but now in the last 1-2 days has worsened again.     Raul also mentions a non-healing rash that looks like bites on his anterior proximal left arm.    PMH, MEDICATIONS, ALLERGIES, SOCIAL AND FAMILY HISTORY in [facility] and reviewed by me personally.      ROS negative other than the symptoms noted above in the HPI.        Examination   /68 (BP Location: Left arm, Patient Position: Sitting, Cuff Size: Adult Large)   Pulse 63   Temp 97.8  F (36.6  C) (Oral)   Ht 1.816 m (5' 11.5\")   Wt 97.1 kg (214 lb)   BMI 29.43 kg/m         Constitutional: Sitting comfortably, in no acute distress. Vital signs noted  Neck:  no adenopathy, trachea midline and normal to palpation  Cardiovascular:  regular rate and rhythm, no murmurs, clicks, or gallops  Respiratory:  normal respiratory rate and rhythm, lungs clear to auscultation  M/S: ROM of elbow limited to 50% of normal flexion. Prominent olecranon process consistent with bursitis.Mildly erythematous. Consistent with bursitis.   NEURO:  muscle strength normal, reflexes normal and symmetric  SKIN: No jaundice/pallor/rash.   Psychiatric: mentation appears normal and affect normal/bright        A/P    ICD-10-CM    1. Olecranon bursitis of left elbow M70.22        DISCUSSION: Called and spoke to [facility] Dr. Murphy's staff with this update. Based on this, Dr. KIRKPATRICK felt " strongly patient should be seen there for closer and continued evaluation. This will likely include fluid aspiration. Called and explained this to Raul, who was able to schedule appt for tomorrow morning.     follow up visit: As needed    [facility]

## 2019-09-19 ENCOUNTER — OFFICE VISIT (OUTPATIENT)
Dept: FAMILY MEDICINE | Facility: CLINIC | Age: 54
End: 2019-09-19

## 2019-09-19 VITALS
BODY MASS INDEX: 28.91 KG/M2 | SYSTOLIC BLOOD PRESSURE: 118 MMHG | HEART RATE: 89 BPM | WEIGHT: 210.2 LBS | DIASTOLIC BLOOD PRESSURE: 70 MMHG | TEMPERATURE: 97.6 F | OXYGEN SATURATION: 98 %

## 2019-09-19 DIAGNOSIS — Z23 NEED FOR VACCINATION: Primary | ICD-10-CM

## 2019-09-19 DIAGNOSIS — R19.5 MUCOUS IN STOOLS: ICD-10-CM

## 2019-09-19 PROCEDURE — 99213 OFFICE O/P EST LOW 20 MIN: CPT | Performed by: PHYSICIAN ASSISTANT

## 2019-09-19 NOTE — NURSING NOTE
Raul is here for stool issues    Pre-visit Screening:  Immunizations:  up to date flu shot  Colonoscopy:  is up to date  Mammogram: NA  Asthma Action Test/Plan:  N  PHQ9:  None  GAD7:  None  Questioned patient about current smoking habits Pt. has never smoked.  Ok to leave detailed message on voice mail for today's visit only Yes, phone # [phone]

## 2019-09-19 NOTE — PROGRESS NOTES
"CC: Mucous in stool    History:  Raul is here today as he has been noticing mucous in stool. This past Sunday, 5 days ago, Raul was having a BM and noticed clear mucous. Since that time, has been having frequent BMs where it is every 2-3 hours, and either is mucous alone, or stool and mucous. Sometimes the stool is yellow \"oatmeal like\" No blood in stool- no red, black. Not having an pain, except if he really strains to having a bowel movement. No fever, sweats, chills, urinary symptoms, or abdominal pain.     Takes a teaspoon of Metamucil as he has been doing for many years, without issues. Started to take a generic probiotic yesterday.     PMH, MEDICATIONS, ALLERGIES, SOCIAL AND FAMILY HISTORY in [facility] and reviewed by me personally.      ROS negative other than the symptoms noted above in the HPI.        Examination   /70 (BP Location: Right arm, Patient Position: Sitting, Cuff Size: Adult Regular)   Pulse 89   Temp 97.6  F (36.4  C) (Oral)   Wt 95.3 kg (210 lb 3.2 oz)   SpO2 98%   BMI 28.91 kg/m         Constitutional: Sitting comfortably, in no acute distress. Vital signs noted  Neck:  no adenopathy, trachea midline and normal to palpation  Cardiovascular:  regular rate and rhythm, no murmurs, clicks, or gallops  Respiratory:  normal respiratory rate and rhythm, lungs clear to auscultation  Abdomen: Abdomen soft, non-tender. BS normal. No masses, organomegaly  Rectal: No external abnormalities. Anoscope exam shows normal mucosa, small amount of mucous. No stool in rectal vault.   SKIN: No jaundice/pallor/rash.   Psychiatric: mentation appears normal and affect normal/bright        A/P    ICD-10-CM    1. Need for vaccination Z23 HC FLU VAC PRESRV FREE QUAD SPLIT VIR 3+YRS IM   2. Mucous in stools R19.5        DISCUSSION:  Reassured by no pain, and still having bowel movements. Suspect he is having overflow passage of loose stool and mucous due to constipation. Continue metamucil and probiotic, also add " Miralax 1/2 dose today, then full dose tomorrow. Contact me next week if symptoms persist, or sooner if symptoms worsen, especially if abdominal pain develops. Based on update, would pursue referral to colo-rectal specialist at that time.     follow up visit: As needed    Melissa Crowe PA-C  [facility]

## 2019-11-08 ENCOUNTER — OFFICE VISIT (OUTPATIENT)
Dept: FAMILY MEDICINE | Facility: CLINIC | Age: 54
End: 2019-11-08

## 2019-11-08 VITALS
WEIGHT: 206 LBS | DIASTOLIC BLOOD PRESSURE: 60 MMHG | SYSTOLIC BLOOD PRESSURE: 118 MMHG | TEMPERATURE: 97.9 F | BODY MASS INDEX: 28.33 KG/M2 | HEART RATE: 58 BPM | OXYGEN SATURATION: 97 %

## 2019-11-08 DIAGNOSIS — J20.9 ACUTE BRONCHITIS, UNSPECIFIED ORGANISM: Primary | ICD-10-CM

## 2019-11-08 PROCEDURE — 99213 OFFICE O/P EST LOW 20 MIN: CPT | Performed by: PHYSICIAN ASSISTANT

## 2019-11-08 RX ORDER — CODEINE PHOSPHATE AND GUAIFENESIN 10; 100 MG/5ML; MG/5ML
1-2 SOLUTION ORAL EVERY 4 HOURS PRN
Qty: 236 ML | Refills: 0 | Status: SHIPPED | OUTPATIENT
Start: 2019-11-08 | End: 2020-05-13

## 2019-11-08 NOTE — NURSING NOTE
Raul is here for a cough for a week.          Pre-visit Screening:  Immunizations:  up to date  Colonoscopy:  is up to date  Mammogram: NA  Asthma Action Test/Plan:  NA  PHQ9:  None  GAD7:  None  Questioned patient about current smoking habits Pt. has never smoked.  Ok to leave detailed message on voice mail for today's visit only Yes, phone #

## 2019-11-08 NOTE — PATIENT INSTRUCTIONS
Most consistent with acute bronchitis. Given relatively short duration of symptoms, clear sounding lungs, and afebrile, this is most likely still a common viral illness and would be unlikely to benefit from an antibiotic.  Okay to continue using OTC cough suppressants as needed. Recommended plenty of rest and fluids. I prescribed codeine solution to use for cough at night. Warned of side effects, including drowsiness, no driving while taking, and also warned of addictive potential and to use sparingly. Contact me in 1 week if not significantly better, or sooner with worsening.

## 2019-11-08 NOTE — PROGRESS NOTES
CC: Cough    History:  Raul is here today with a cough that started 1 week. Cough started as a very satisfying cough where he would cough and phlegm would come up and he would feel better. Since then, the cough has worsened. Comes in fits. Minimally productive, unless he has a severe coughing spasm, which is almost to the point of throwing up. He did try NyQuil last night, which helped him sleep, but woke up with severe cough again. No fever, sweats, chills, SOB, wheezing.     No history of pneumonia, but does have a history of asthma, but asthma symptoms fully resolved after starting budesonide nasal spray, and hasn't needed an inhaler in years.    Pt mentioned that he spend time frequently in [place], [place], where there is a higher concentration of blastomycosis.     PMH, MEDICATIONS, ALLERGIES, SOCIAL AND FAMILY HISTORY in [facility] and reviewed by me personally.    ROS negative other than the symptoms noted above in the HPI.    Examination   /60 (BP Location: Right arm, Patient Position: Sitting, Cuff Size: Adult Large)   Pulse 58   Temp 97.9  F (36.6  C) (Oral)   Wt 93.4 kg (206 lb)   SpO2 97%   BMI 28.33 kg/m       Constitutional: Sitting comfortably, in no acute distress. Vital signs noted  Eyes: pupils equal round reactive to light and accomodation, extra ocular movements intact  Ears: external canals and TMs free of abnormalities  Nose: patent, without mucosal abnormalities  Mouth and throat: without erythema or lesions of the mucosa  Neck:  no adenopathy, trachea midline and normal to palpation  Cardiovascular:  regular rate and rhythm, no murmurs, clicks, or gallops  Respiratory:  normal respiratory rate and rhythm, lungs clear to auscultation  SKIN: No jaundice/pallor/rash.   Psychiatric: mentation appears normal and affect normal/bright      A/P    ICD-10-CM    1. Acute bronchitis, unspecified organism J20.9 guaiFENesin-codeine (ROBITUSSIN AC) 100-10 MG/5ML solution       DISCUSSION:  Most consistent  with acute bronchitis. Given relatively short duration of symptoms, clear sounding lungs, and afebrile, this is most likely still a common viral illness and would be unlikely to benefit from an antibiotic.  Okay to continue using OTC cough suppressants as needed. Recommended plenty of rest and fluids. I prescribed codeine solution to use for cough at night. Warned of side effects, including drowsiness, no driving while taking, and also warned of addictive potential and to use sparingly. Contact me in 1 week if not significantly better, or sooner with worsening.       follow up visit: As needed    Melissa Crowe PA-C  [facility]

## 2019-12-13 ENCOUNTER — TELEPHONE (OUTPATIENT)
Dept: FAMILY MEDICINE | Facility: CLINIC | Age: 54
End: 2019-12-13

## 2019-12-15 ENCOUNTER — HEALTH MAINTENANCE LETTER (OUTPATIENT)
Age: 54
End: 2019-12-15

## 2020-01-09 NOTE — TELEPHONE ENCOUNTER
[facility] called stating they did no receive records that were mailed, re printed and faxed records to [phone]

## 2020-01-13 NOTE — TELEPHONE ENCOUNTER
[facility] calling, still did not get fax or mailed records.  Mailing records again to   [facility]  [address]   [place] [address]    If these are not received, will request that new release is sent as this is the 4th time sending the same records.

## 2020-04-28 ENCOUNTER — MYC MEDICAL ADVICE (OUTPATIENT)
Dept: FAMILY MEDICINE | Facility: CLINIC | Age: 55
End: 2020-04-28

## 2020-05-13 ENCOUNTER — OFFICE VISIT (OUTPATIENT)
Dept: FAMILY MEDICINE | Facility: CLINIC | Age: 55
End: 2020-05-13

## 2020-05-13 VITALS
OXYGEN SATURATION: 98 % | WEIGHT: 205 LBS | DIASTOLIC BLOOD PRESSURE: 78 MMHG | HEART RATE: 62 BPM | HEIGHT: 71 IN | SYSTOLIC BLOOD PRESSURE: 134 MMHG | BODY MASS INDEX: 28.7 KG/M2 | RESPIRATION RATE: 18 BRPM | TEMPERATURE: 98.1 F

## 2020-05-13 DIAGNOSIS — J31.0 CHRONIC RHINITIS: ICD-10-CM

## 2020-05-13 DIAGNOSIS — R05.3 CHRONIC COUGH: Primary | ICD-10-CM

## 2020-05-13 LAB
% GRANULOCYTES: 47.6 %
HCT VFR BLD AUTO: 46.6 % (ref 40–53)
HEMOGLOBIN: 15.9 G/DL (ref 13.3–17.7)
LYMPHOCYTES NFR BLD AUTO: 42.9 %
MCH RBC QN AUTO: 29.6 PG (ref 26–33)
MCHC RBC AUTO-ENTMCNC: 34.1 G/DL (ref 31–36)
MCV RBC AUTO: 86.7 FL (ref 78–100)
MONOCYTES NFR BLD AUTO: 9.5 %
PLATELET COUNT - QUEST: 135 10^9/L (ref 150–375)
RBC # BLD AUTO: 5.37 10*12/L (ref 4.4–5.9)
WBC # BLD AUTO: 6.3 10*9/L (ref 4–11)

## 2020-05-13 PROCEDURE — 71046 X-RAY EXAM CHEST 2 VIEWS: CPT | Performed by: FAMILY MEDICINE

## 2020-05-13 PROCEDURE — 85025 COMPLETE CBC W/AUTO DIFF WBC: CPT | Performed by: FAMILY MEDICINE

## 2020-05-13 PROCEDURE — 36415 COLL VENOUS BLD VENIPUNCTURE: CPT | Performed by: FAMILY MEDICINE

## 2020-05-13 PROCEDURE — 99213 OFFICE O/P EST LOW 20 MIN: CPT | Performed by: FAMILY MEDICINE

## 2020-05-13 RX ORDER — MONTELUKAST SODIUM 10 MG/1
10 TABLET ORAL AT BEDTIME
Qty: 30 TABLET | Refills: 0 | Status: SHIPPED | OUTPATIENT
Start: 2020-05-13 | End: 2021-01-28

## 2020-05-13 ASSESSMENT — MIFFLIN-ST. JEOR: SCORE: 1795.96

## 2020-05-13 NOTE — NURSING NOTE
Raul is here for on going cough and dry throat which may be d/t allergies    Pre-visit Screening:  Immunizations:  up to date  Colonoscopy:  is up to date  Mammogram: NA  Asthma Action Test/Plan:  NA  PHQ9:  None  GAD7:  None  Questioned patient about current smoking habits Pt. has never smoked.  Ok to leave detailed message on voice mail for today's visit only Yes, phone # [phone]

## 2020-05-13 NOTE — PROGRESS NOTES
SUBJECTIVE: 54 year old male complaining of dry spot on the right back of his throat for 2 years. Always at night when he lays down after a few hours. Moisturizing helps. Loses voice with talking also makes it flare. Talking a lot starts to have a dry cough. Went to DR Lawrence for a full evaluation and negative exam.    In January, had an illness with body aches, respiratory congestion and cough productive of phlegm for 1 week(s).   The patient describes started after wife returned from a cruise and was also ill. Slowly improved over 2-3 weeks. Now feels a right side chest pressure.   The patient denies a history of fever, productive phlegm, exercise intolerance but often coughs after the activity is done.   Smoking history: No.   Relevant past medical history: positive for previous inhalers age 40 diagnosed exercise induced asthma/ often when snow would melt. Not used for 2 years/ uses Rhinocort twice daily year around and no inhaler needed/ allergies suspected    Reviewed chart with allergy, ENT, swallowing test, etc since 2016    OBJECTIVE: The patient appears healthy, alert, no distress, cooperative and no coughing.   EARS: negative  NOSE/SINUS: positive findings: mucosa swollen, pale, and boggy, clear rhinorrhea   THROAT: normal and post nasal drainage   NECK:Neck supple. No adenopathy. Thyroid symmetric, normal size,, Carotids without bruits.   CHEST: Regular rate and  rhythm. S1 and S2 normal, no murmurs, clicks, gallops or rubs. No edema or JVD. Chest is clear; no wheezes or rales.    CXR: WNL  CBC; WNL      ASSESSMENT: (R05) Chronic cough  (primary encounter diagnosis)  Comment: continue Rhinocort daily  Plan: XR Chest 2 Views, CL AFF HEMOGRAM/PLATE/DIFF         ([facility]), VENOUS COLLECTION, PULMONARY MEDICINE         REFERRAL, montelukast (SINGULAIR) 10 MG tablet        Potential medication side effects were discussed with the patient; let me know if any occur.  Schedule pulmonary evaluation    (J31.0) Chronic  rhinitis  Comment: consider return to ENT  Plan: monitor.

## 2020-11-06 ENCOUNTER — ALLIED HEALTH/NURSE VISIT (OUTPATIENT)
Dept: FAMILY MEDICINE | Facility: CLINIC | Age: 55
End: 2020-11-06

## 2020-11-06 DIAGNOSIS — Z23 NEED FOR VACCINATION: Primary | ICD-10-CM

## 2020-11-06 PROCEDURE — 90471 IMMUNIZATION ADMIN: CPT | Performed by: PHYSICIAN ASSISTANT

## 2020-11-06 PROCEDURE — 90686 IIV4 VACC NO PRSV 0.5 ML IM: CPT | Performed by: PHYSICIAN ASSISTANT

## 2021-01-15 ENCOUNTER — HEALTH MAINTENANCE LETTER (OUTPATIENT)
Age: 56
End: 2021-01-15

## 2021-01-28 ENCOUNTER — OFFICE VISIT (OUTPATIENT)
Dept: FAMILY MEDICINE | Facility: CLINIC | Age: 56
End: 2021-01-28

## 2021-01-28 VITALS
DIASTOLIC BLOOD PRESSURE: 68 MMHG | WEIGHT: 204 LBS | OXYGEN SATURATION: 97 % | BODY MASS INDEX: 28.25 KG/M2 | HEART RATE: 60 BPM | SYSTOLIC BLOOD PRESSURE: 112 MMHG | TEMPERATURE: 97.7 F

## 2021-01-28 DIAGNOSIS — L82.1 SEBORRHEIC KERATOSES: Primary | ICD-10-CM

## 2021-01-28 PROCEDURE — 99212 OFFICE O/P EST SF 10 MIN: CPT | Performed by: PHYSICIAN ASSISTANT

## 2021-01-28 NOTE — PROGRESS NOTES
CC: Skin lesions    History:  Chest change:  Noticed a skin change on upper chest. Noticed about 1 week about. Raised bump. Not painful, no drainage. Did seem to have some redness around it. No injury to that area. Does not do any grooming to that area. Does use sauna regularly. No hot tub use since last night. No personal or family history of skin cancers.     Back change:  1 month ago, wife was looking at his back and noticed a change. He does not think this is there anymore, but would like me to look    PMH, MEDICATIONS, ALLERGIES, SOCIAL AND FAMILY HISTORY in EPIC and reviewed by me personally.    ROS negative other than the symptoms noted above in the HPI.     Examination   /68   Pulse 60   Temp 97.7  F (36.5  C) (Oral)   Wt 92.5 kg (204 lb)   SpO2 97%   BMI 28.25 kg/m       Constitutional: Sitting comfortably, in no acute distress. Vital signs noted  SKIN: No jaundice/pallor. Flesh toned verrucous lesion 6 mm by 3 mm on upper right chest. No drainage, erythema. Mild scaling.  Psychiatric: mentation appears normal and affect normal/bright        A/P    ICD-10-CM    1. Seborrheic keratoses  L82.1        DISCUSSION:  Most consistent with seborrheic keratosis. Informed of benign nature. Unfortunately, this has a somewhat atypical appearance for an SK, so if not resolving, I would recommending having a dermatologist examine prior to monitoring indefinitely. Can try to apply hydrocortisone twice daily for 14 days, if still not improving/resolving, contact me and would refer to dermatology for possible biopsy.     follow up visit: As needed    Melissa Crowe PA-C  [facility]

## 2021-01-28 NOTE — NURSING NOTE
Raul is here for hard lump on the front side of chest that recently appeared.        Pre-visit Screening:  Immunizations:  up to date  Colonoscopy:  is up to date  Mammogram: NA  Asthma Action Test/Plan:  NA  PHQ9:  NA  GAD7:  NA  Questioned patient about current smoking habits Pt. has never smoked.  Ok to leave detailed message on voice mail for today's visit only Yes, phone # mobile

## 2021-03-11 ENCOUNTER — MEDICAL CORRESPONDENCE (OUTPATIENT)
Dept: HEALTH INFORMATION MANAGEMENT | Facility: CLINIC | Age: 56
End: 2021-03-11

## 2021-09-04 ENCOUNTER — HEALTH MAINTENANCE LETTER (OUTPATIENT)
Age: 56
End: 2021-09-04

## 2021-10-27 PROBLEM — J45.909 ASTHMA: Status: ACTIVE | Noted: 2021-10-27

## 2021-11-30 ENCOUNTER — OFFICE VISIT (OUTPATIENT)
Dept: FAMILY MEDICINE | Facility: CLINIC | Age: 56
End: 2021-11-30

## 2021-11-30 VITALS
OXYGEN SATURATION: 97 % | HEIGHT: 71 IN | HEART RATE: 56 BPM | SYSTOLIC BLOOD PRESSURE: 112 MMHG | BODY MASS INDEX: 29.4 KG/M2 | DIASTOLIC BLOOD PRESSURE: 70 MMHG | TEMPERATURE: 98.2 F | WEIGHT: 210 LBS

## 2021-11-30 DIAGNOSIS — M54.41 ACUTE LEFT-SIDED LOW BACK PAIN WITH RIGHT-SIDED SCIATICA: Primary | ICD-10-CM

## 2021-11-30 DIAGNOSIS — M79.651 PAIN IN BOTH THIGHS: ICD-10-CM

## 2021-11-30 DIAGNOSIS — R25.2 MUSCLE CRAMPING: ICD-10-CM

## 2021-11-30 DIAGNOSIS — M79.652 PAIN IN BOTH THIGHS: ICD-10-CM

## 2021-11-30 LAB
ALBUMIN SERPL-MCNC: 4.6 G/DL (ref 3.6–5.1)
ALBUMIN/GLOB SERPL: 1.9 {RATIO} (ref 1–2.5)
ALP SERPL-CCNC: 51 U/L (ref 33–130)
ALT 1742-6: 25 U/L (ref 0–32)
AST 1920-8: 19 U/L (ref 0–35)
BILIRUB SERPL-MCNC: 1 MG/DL (ref 0.2–1.2)
BUN SERPL-MCNC: 14 MG/DL (ref 7–25)
BUN/CREATININE RATIO: 9.7 (ref 6–22)
CALCIUM SERPL-MCNC: 9.3 MG/DL (ref 8.6–10.3)
CHLORIDE SERPLBLD-SCNC: 108.1 MMOL/L (ref 98–110)
CO2 SERPL-SCNC: 30 MMOL/L (ref 20–32)
CREAT SERPL-MCNC: 1.4 MG/DL (ref 0.6–1.3)
ERYTHROCYTE [DISTWIDTH] IN BLOOD BY AUTOMATED COUNT: 12.4 %
ERYTHROCYTE [SEDIMENTATION RATE] IN BLOOD: 1 MM/HR (ref 0–20)
GFR SERPL CREATININE-BSD FRML MDRD: 59 ML/MIN/1.73M2
GLOBULIN, CALCULATED - QUEST: 2.4 (ref 1.9–3.7)
GLUCOSE SERPL-MCNC: 102 MG/DL (ref 60–99)
HCT VFR BLD AUTO: 45.4 % (ref 40–53)
HEMOGLOBIN: 15.2 G/DL (ref 13.3–17.7)
MCH RBC QN AUTO: 29.6 PG (ref 26–33)
MCHC RBC AUTO-ENTMCNC: 33.5 G/DL (ref 31–36)
MCV RBC AUTO: 88.3 FL (ref 78–100)
PLATELET COUNT - QUEST: 165 10^9/L (ref 150–375)
POTASSIUM SERPL-SCNC: 4.07 MMOL/L (ref 3.5–5.3)
PROT SERPL-MCNC: 7 G/DL (ref 6.1–8.1)
RBC # BLD AUTO: 5.14 10*12/L (ref 4.4–5.9)
SODIUM SERPL-SCNC: 144.8 MMOL/L (ref 135–146)
WBC # BLD AUTO: 6.2 10*9/L (ref 4–11)

## 2021-11-30 PROCEDURE — 72110 X-RAY EXAM L-2 SPINE 4/>VWS: CPT | Performed by: PHYSICIAN ASSISTANT

## 2021-11-30 PROCEDURE — 84443 ASSAY THYROID STIM HORMONE: CPT | Mod: 90 | Performed by: PHYSICIAN ASSISTANT

## 2021-11-30 PROCEDURE — 85027 COMPLETE CBC AUTOMATED: CPT | Performed by: PHYSICIAN ASSISTANT

## 2021-11-30 PROCEDURE — 36415 COLL VENOUS BLD VENIPUNCTURE: CPT | Performed by: PHYSICIAN ASSISTANT

## 2021-11-30 PROCEDURE — 86140 C-REACTIVE PROTEIN: CPT | Mod: 90 | Performed by: PHYSICIAN ASSISTANT

## 2021-11-30 PROCEDURE — 80053 COMPREHEN METABOLIC PANEL: CPT | Performed by: PHYSICIAN ASSISTANT

## 2021-11-30 PROCEDURE — 85651 RBC SED RATE NONAUTOMATED: CPT | Performed by: PHYSICIAN ASSISTANT

## 2021-11-30 PROCEDURE — 99213 OFFICE O/P EST LOW 20 MIN: CPT | Performed by: PHYSICIAN ASSISTANT

## 2021-11-30 ASSESSMENT — MIFFLIN-ST. JEOR: SCORE: 1808.64

## 2021-11-30 NOTE — PROGRESS NOTES
"CC: Joint pain    History:  Raul is here today with \"ripping\" pain in front of thighs, pain spanning from posterior buttock to posterior knee burning pain on left side. Then gets another pain from inside of right knee to inside of right thigh. This has been happening particularly in cold weather for the past several years. Does have chronic low back pain, and has needed injection into left S1 joint in the past that worked at that time. Was doing low back PT at [facility] several years ago, but had to stop maintenance therapy due to elbow bursitis.      PMH, MEDICATIONS, ALLERGIES, SOCIAL AND FAMILY HISTORY in [facility] and reviewed by me personally.    ROS negative other than the symptoms noted above in the HPI.     Examination   /70 (BP Location: Left arm, Patient Position: Sitting, Cuff Size: Adult Large)   Pulse 56   Temp 98.2  F (36.8  C) (Temporal)   Ht 1.81 m (5' 11.25\")   Wt 95.3 kg (210 lb)   SpO2 97%   BMI 29.08 kg/m       Constitutional: Sitting comfortably, in no acute distress. Vital signs noted  Neck:  no adenopathy, trachea midline and normal to palpation, thyroid normal to palpation  Cardiovascular:  regular rate and rhythm, no murmurs, clicks, or gallops  Respiratory:  normal respiratory rate and rhythm, lungs clear to auscultation  M/S:  ROM of back intact other than limited to 50% of normal flexion. Tender to palpation over Paraspinous muscles, sciatic nerve bilaterally.   Neuro: Strength intact. Sensation intact.    SKIN: No jaundice/pallor/rash.   Psychiatric: mentation appears normal and affect normal/bright        A/P    ICD-10-CM    1. Acute left-sided low back pain with right-sided sciatica  M54.41 XR Lumbar Spine G/E 4 Views     VENOUS COLLECTION     Comprehensive Metobolic Panel ([facility])     C-Reactive Protein CRP ([facility])     ESR WESTERGREN ([facility])     Hemogram Platelet ([facility])     TSH WITH FREE T4 REFLEX ([facility])   2. Pain in both thighs  M79.651 XR Lumbar Spine G/E 4 " Views    M79.652 VENOUS COLLECTION     Comprehensive Metobolic Panel ([facility])     C-Reactive Protein CRP ([facility])     ESR WESTERGREN ([facility])     Hemogram Platelet ([facility])     TSH WITH FREE T4 REFLEX ([facility])   3. Muscle cramping  R25.2 VENOUS COLLECTION     Comprehensive Metobolic Panel ([facility])     C-Reactive Protein CRP ([facility])     ESR WESTERGREN ([facility])     Hemogram Platelet ([facility])     TSH WITH FREE T4 REFLEX ([facility])       DISCUSSION:  Low back pain, buttock pain, thigh pain:  Completed lumbar x-ray today. Radiology report pending. Will check CMP, CBC, ESR, CRP, TSH, and contact via [facility] with results when available. Based on x-ray report will likely refer to spine specialist, as I suspect symptoms are due to lumbar radiculopathy.     follow up visit: As needed    Melissa Way PA-C  [facility]

## 2021-12-02 ENCOUNTER — TELEPHONE (OUTPATIENT)
Dept: FAMILY MEDICINE | Facility: CLINIC | Age: 56
End: 2021-12-02

## 2021-12-02 DIAGNOSIS — M54.42 LOW BACK PAIN WITH NEURALGIA OF LEFT SCIATIC NERVE: ICD-10-CM

## 2021-12-02 DIAGNOSIS — M79.652 PAIN IN BOTH THIGHS: Primary | ICD-10-CM

## 2021-12-02 DIAGNOSIS — M79.651 PAIN IN BOTH THIGHS: Primary | ICD-10-CM

## 2021-12-02 LAB
CRP SERPL-MCNC: 0.6 MG/L (ref 0–0.8)
TSH SERPL-ACNC: 2.41 MIU/L (ref 0.4–4.5)

## 2021-12-02 NOTE — TELEPHONE ENCOUNTER
Called and spoke to patient. Informed of normal labs, x-ray with facet arthropathy. Recommended restart physical therapy, and also will refer to orthopedic specialist.

## 2022-02-19 ENCOUNTER — HEALTH MAINTENANCE LETTER (OUTPATIENT)
Age: 57
End: 2022-02-19

## 2022-05-04 ENCOUNTER — OFFICE VISIT (OUTPATIENT)
Dept: FAMILY MEDICINE | Facility: CLINIC | Age: 57
End: 2022-05-04

## 2022-05-04 VITALS
TEMPERATURE: 97.7 F | SYSTOLIC BLOOD PRESSURE: 118 MMHG | WEIGHT: 220 LBS | OXYGEN SATURATION: 97 % | HEIGHT: 71 IN | BODY MASS INDEX: 30.8 KG/M2 | HEART RATE: 66 BPM | DIASTOLIC BLOOD PRESSURE: 78 MMHG

## 2022-05-04 DIAGNOSIS — K90.41 NON-CELIAC GLUTEN SENSITIVITY: ICD-10-CM

## 2022-05-04 DIAGNOSIS — J45.20 MILD INTERMITTENT ASTHMA WITHOUT COMPLICATION: Primary | ICD-10-CM

## 2022-05-04 PROCEDURE — 99213 OFFICE O/P EST LOW 20 MIN: CPT | Performed by: PHYSICIAN ASSISTANT

## 2022-05-04 RX ORDER — ERGOCALCIFEROL 1.25 MG/1
50000 CAPSULE ORAL
COMMUNITY
End: 2022-05-04

## 2022-05-04 RX ORDER — ALBUTEROL SULFATE 90 UG/1
2 AEROSOL, METERED RESPIRATORY (INHALATION) EVERY 6 HOURS
Qty: 36 G | Refills: 1 | Status: SHIPPED | OUTPATIENT
Start: 2022-05-04 | End: 2023-01-31

## 2022-05-04 RX ORDER — PSYLLIUM HUSK 3.4 G/5.8G
1 POWDER, FOR SUSPENSION ORAL
COMMUNITY
End: 2023-07-12

## 2022-05-04 RX ORDER — LANSOPRAZOLE 30 MG/1
15 CAPSULE, DELAYED RELEASE ORAL
COMMUNITY

## 2022-05-04 ASSESSMENT — ASTHMA QUESTIONNAIRES: ACT_TOTALSCORE: 14

## 2022-05-04 NOTE — LETTER
My Asthma Action Plan    Name: Dmitriy Saunders   YOB: 1965  Date: 5/4/2022   My doctor: Melissa Way PA-C   My clinic: [facility]        My Rescue Medicine:   Albuterol inhaler (Proair/Ventolin/Proventil HFA)  2-4 puffs EVERY 4 HOURS as needed. Use a spacer if recommended by your provider.   My Asthma Severity:   Intermittent / Exercise Induced  Know your asthma triggers: mold             GREEN ZONE   Good Control    I feel good    No cough or wheeze    Can work, sleep and play without asthma symptoms       Take your asthma control medicine every day.     1. If exercise triggers your asthma, take your rescue medication    15 minutes before exercise or sports, and    During exercise if you have asthma symptoms  2. Spacer to use with inhaler: If you have a spacer, make sure to use it with your inhaler             YELLOW ZONE Getting Worse  I have ANY of these:    I do not feel good    Cough or wheeze    Chest feels tight    Wake up at night   1. Keep taking your Green Zone medications  2. Start taking your rescue medicine:    every 20 minutes for up to 1 hour. Then every 4 hours for 24-48 hours.  3. If you stay in the Yellow Zone for more than 12-24 hours, contact your doctor.  4. If you do not return to the Green Zone in 12-24 hours or you get worse, start taking your oral steroid medicine if prescribed by your provider.           RED ZONE Medical Alert - Get Help  I have ANY of these:    I feel awful    Medicine is not helping    Breathing getting harder    Trouble walking or talking    Nose opens wide to breathe       1. Take your rescue medicine NOW  2. If your provider has prescribed an oral steroid medicine, start taking it NOW  3. Call your doctor NOW  4. If you are still in the Red Zone after 20 minutes and you have not reached your doctor:    Take your rescue medicine again and    Call 911 or go to the emergency room right away    See your regular doctor within 2  weeks of an Emergency Room or Urgent Care visit for follow-up treatment.          Annual Reminders:  Meet with Asthma Educator,  Flu Shot in the Fall, consider Pneumonia Vaccination for patients with asthma (aged 19 and older).    Pharmacy: [facility] #[id number] [address] AT [facility] [address]    Electronically signed by Melissa Way PA-C   Date: 05/04/22                    Asthma Triggers  How To Control Things That Make Your Asthma Worse    Triggers are things that make your asthma worse.  Look at the list below to help you find your triggers and   what you can do about them. You can help prevent asthma flare-ups by staying away from your triggers.      Trigger                                                          What you can do   Cigarette Smoke  Tobacco smoke can make asthma worse. Do not allow smoking in your home, car or around you.  Be sure no one smokes at a child s day care or school.  If you smoke, ask your health care provider for ways to help you quit.  Ask family members to quit too.  Ask your health care provider for a referral to Quit Plan to help you quit smoking, or call 0-976-548-PLAN.     Colds, Flu, Bronchitis  These are common triggers of asthma. Wash your hands often.  Don t touch your eyes, nose or mouth.  Get a flu shot every year.     Dust Mites  These are tiny bugs that live in cloth or carpet. They are too small to see. Wash sheets and blankets in hot water every week.   Encase pillows and mattress in dust mite proof covers.  Avoid having carpet if you can. If you have carpet, vacuum weekly.   Use a dust mask and HEPA vacuum.   Pollen and Outdoor Mold  Some people are allergic to trees, grass, or weed pollen, or molds. Try to keep your windows closed.  Limit time out doors when pollen count is high.   Ask you health care provider about taking medicine during allergy season.     Animal Dander  Some people are allergic to skin flakes, urine  or saliva from pets with fur or feathers. Keep pets with fur or feathers out of your home.    If you can t keep the pet outdoors, then keep the pet out of your bedroom.  Keep the bedroom door closed.  Keep pets off cloth furniture and away from stuffed toys.     Mice, Rats, and Cockroaches  Some people are allergic to the waste from these pests.   Cover food and garbage.  Clean up spills and food crumbs.  Store grease in the refrigerator.   Keep food out of the bedroom.   Indoor Mold  This can be a trigger if your home has high moisture. Fix leaking faucets, pipes, or other sources of water.   Clean moldy surfaces.  Dehumidify basement if it is damp and smelly.   Smoke, Strong Odors, and Sprays  These can reduce air quality. Stay away from strong odors and sprays, such as perfume, powder, hair spray, paints, smoke incense, paint, cleaning products, candles and new carpet.   Exercise or Sports  Some people with asthma have this trigger. Be active!  Ask your doctor about taking medicine before sports or exercise to prevent symptoms.    Warm up for 5-10 minutes before and after sports or exercise.     Other Triggers of Asthma  Cold air:  Cover your nose and mouth with a scarf.  Sometimes laughing or crying can be a trigger.  Some medicines and food can trigger asthma.

## 2022-05-04 NOTE — NURSING NOTE
Chief Complaint   Patient presents with     Recheck Medication       Pre-visit Screening:  Immunizations:  up to date  Colonoscopy:  is up to date  Mammogram: NA  Asthma Action Test/Plan:  Yes  PHQ9:  NA  GAD7:  NA  Questioned patient about current smoking habits Pt. has never smoked.  Ok to leave detailed message on voice mail for today's visit only Yes, phone # [phone]

## 2022-05-04 NOTE — PROGRESS NOTES
"CC: Breathing issues, gluten sensitivity     History:  Asthma:  Diagnosed with asthma in the past 10-15 years. Had been using albuterol inhaler as needed especially with snow melt. Has not needed for the past several years. However, was recently in [place] and this seemed to flare allergies. Found albuterol inhaler that he has been using, and this is helping his symptoms. Also uses budesonide nasal spray for chronic rhinitis. No fever, night sweats, weight loss.     Gluten sensitivity:  Had executive physical at [facility] 1/2022, where they checked his tissue transglutaminase and was negative. At that time, he was following gluten free diet.     Recently found an enzyme pill that helps him with gluten sensitivity. This has helped with severe rashes that he gets with gluten, so he has resumed eating gluten again. Joints also got better. Did have tissue tranglutaminase checked through [place] 1/2022 when he was on gluten free diet.     PMH, MEDICATIONS, ALLERGIES, SOCIAL AND FAMILY HISTORY in [facility] and reviewed by me personally.    ROS negative other than the symptoms noted above in the HPI.      Examination   /78 (BP Location: Left arm, Patient Position: Sitting, Cuff Size: Adult Large)   Pulse 66   Temp 97.7  F (36.5  C) (Temporal)   Ht 1.81 m (5' 11.25\")   Wt 99.8 kg (220 lb)   SpO2 97%   BMI 30.47 kg/m       Constitutional: Sitting comfortably, in no acute distress. Vital signs noted  Neck:  no adenopathy, trachea midline and normal to palpation, thyroid normal to palpation  Cardiovascular:  regular rate and rhythm, no murmurs, clicks, or gallops  Respiratory:  normal respiratory rate and rhythm, lungs clear to auscultation  SKIN: No jaundice/pallor/rash.   Psychiatric: mentation appears normal and affect normal/bright    A/P    ICD-10-CM    1. Mild intermittent asthma without complication  J45.20 albuterol (PROAIR HFA/PROVENTIL HFA/VENTOLIN HFA) 108 (90 Base) MCG/ACT inhaler "       DISCUSSION:  Asthma:  Consistent with flare of asthma. May be worsening with general inflammation higher now back on gluten. Agreed to refill albuterol inhaler to use as needed. Explained that if symptoms persist, he should contact me and may need to start corticosteroid inhaler.     Gluten sensitivity:  Placed standing order for tissue tranglutaminase if Raul would like to have this rechecked now that he is back on gluten diet.     follow up visit:     Melissa Way PA-C  [facility]

## 2022-05-05 ENCOUNTER — MYC MEDICAL ADVICE (OUTPATIENT)
Dept: FAMILY MEDICINE | Facility: CLINIC | Age: 57
End: 2022-05-05

## 2022-05-05 ENCOUNTER — TELEPHONE (OUTPATIENT)
Dept: FAMILY MEDICINE | Facility: CLINIC | Age: 57
End: 2022-05-05

## 2022-05-05 DIAGNOSIS — K90.41 NON-CELIAC GLUTEN SENSITIVITY: Primary | ICD-10-CM

## 2022-07-18 ENCOUNTER — TRANSFERRED RECORDS (OUTPATIENT)
Dept: FAMILY MEDICINE | Facility: CLINIC | Age: 57
End: 2022-07-18

## 2022-09-09 ENCOUNTER — HOSPITAL ENCOUNTER (EMERGENCY)
Facility: CLINIC | Age: 57
Discharge: HOME | End: 2022-09-09
Attending: EMERGENCY MEDICINE | Admitting: EMERGENCY MEDICINE
Payer: COMMERCIAL

## 2022-09-09 VITALS
OXYGEN SATURATION: 95 % | TEMPERATURE: 97.1 F | SYSTOLIC BLOOD PRESSURE: 129 MMHG | DIASTOLIC BLOOD PRESSURE: 85 MMHG | HEART RATE: 51 BPM | RESPIRATION RATE: 16 BRPM

## 2022-09-09 DIAGNOSIS — H92.01 OTALGIA, RIGHT: ICD-10-CM

## 2022-09-09 PROCEDURE — 99283 EMERGENCY DEPT VISIT LOW MDM: CPT

## 2022-09-09 ASSESSMENT — ACTIVITIES OF DAILY LIVING (ADL): ADLS_ACUITY_SCORE: 33

## 2022-09-09 NOTE — DISCHARGE INSTRUCTIONS
You have bleeding behind your eardrum.  This is concerning for rupture of your eardrum.  We will start you on antibiotics and have you follow up closely with ENT clinic as discussed.

## 2022-09-09 NOTE — ED TRIAGE NOTES
R ear pain that started today. Did do sinus rinsing this morning and feels that is what started the pain. Tried warm compress and biofreeze to area around his ear w/o relief. Took tylenol 1 hr pta. Denies drainage, hearing loss, or fevers.     Triage Assessment     Row Name 09/09/22 0006       Triage Assessment (Adult)    Airway WDL WDL       Respiratory WDL    Respiratory WDL WDL       Skin Circulation/Temperature WDL    Skin Circulation/Temperature WDL WDL       Cognitive/Neuro/Behavioral WDL    Cognitive/Neuro/Behavioral WDL WDL

## 2022-09-09 NOTE — ED PROVIDER NOTES
History     Chief Complaint:  Otalgia     HPI   Dmitriy Saunders is a 57 year old male who presents with otalgia. The patient reports that he had an onset of right ear pain starting tonight. He states that his pain when presenting was initially a 9/10 but is currently a 5/10. He notes that this morning he was doing a nasal lavage that he does routinely and he felt the fluid travel to his right ear but he had no associated pain at that time. Patient denies any hearing loss. He took tylenol at home for the pain.     Review of Systems   HENT: Positive for ear pain. Negative for hearing loss.    All other systems reviewed and are negative.    Allergies:  Erythromycin    Medications:  Albuterol   Rinocort   Prevacid     Past Medical History:     Asthma     Past Surgical History:    Ent surgery   Orthopedic surgery     Social History:  The patient presents to the ED alone.   PCP: Celeste Lopez     Physical Exam     Patient Vitals for the past 24 hrs:   BP Temp Temp src Pulse Resp SpO2   09/09/22 0004 129/85 97.1  F (36.2  C) Temporal 51 16 95 %     Physical Exam  Nursing note and vitals reviewed.  Constitutional: Cooperative.   HENT:   Mouth/Throat: Mucous membranes are normal. Right ear hemotympanum. No obvious perforation. No mastoid tenderness.  Cardiovascular: Normal rate, regular rhythm and normal heart sounds.  No murmur.  Pulmonary/Chest: Effort normal and breath sounds normal. No respiratory distress. No wheezes.  Neurological: Alert. Oriented x4.   Skin: Skin is warm and dry.   Psychiatric: Normal mood and affect.     Emergency Department Course     Reviewed:  I reviewed nursing notes, vitals and past medical history    Assessments:  0146 I obtained history and examined the patient as noted above.     Disposition:  The patient was discharged to home.     Impression & Plan     Medical Decision Making:  Chip is a 57 year old gentleman who presents with right otalgia following routine sinus  flushing. On exam he has hemorrhage noted deep to his tympanic membrane . Unclear if he had a tympanic membrane rupture or if this bleeding is from deeper within. We will start him on antibiotics prophylacticly and will have him follow up with ENT. He had had no falls,  Head trauma, or other need for head CT imaging. Patient comfortable with this plan and feels improved after tylenol pta.     Diagnosis:    ICD-10-CM    1. Otalgia, right - with hemotympanum  H92.01      Discharge Medications:  Discharge Medication List as of 9/9/2022  1:53 AM      START taking these medications    Details   amoxicillin-clavulanate (AUGMENTIN) 875-125 MG tablet Take 1 tablet by mouth 2 times daily, Disp-20 tablet, R-0, Local Print           Scribe Disclosure:  ISHAN, Parul Beverly, am serving as a scribe at 1:58 AM on 9/9/2022 to document services personally performed by Felix Cameron MD based on my observations and the provider's statements to me.              Felix Cameron MD  09/09/22 0689

## 2022-09-22 ENCOUNTER — TRANSFERRED RECORDS (OUTPATIENT)
Dept: FAMILY MEDICINE | Facility: CLINIC | Age: 57
End: 2022-09-22

## 2022-10-16 ENCOUNTER — HEALTH MAINTENANCE LETTER (OUTPATIENT)
Age: 57
End: 2022-10-16

## 2022-12-15 ENCOUNTER — TRANSFERRED RECORDS (OUTPATIENT)
Dept: FAMILY MEDICINE | Facility: CLINIC | Age: 57
End: 2022-12-15

## 2023-01-31 DIAGNOSIS — J45.20 MILD INTERMITTENT ASTHMA WITHOUT COMPLICATION: ICD-10-CM

## 2023-01-31 RX ORDER — ALBUTEROL SULFATE 90 UG/1
2 AEROSOL, METERED RESPIRATORY (INHALATION) EVERY 6 HOURS
Qty: 18 G | Refills: 1 | Status: SHIPPED | OUTPATIENT
Start: 2023-01-31 | End: 2023-05-09

## 2023-01-31 NOTE — TELEPHONE ENCOUNTER
Pt called asking for a refill of his albuterol inhaler. Stated he is headed on vacation this weekend and would like to take one with him. Please advise.    Dmitriy Saunders is requesting a refill of:    Pending Prescriptions:                       Disp   Refills    albuterol (PROAIR HFA/PROVENTIL HFA/NETTE*36 g   1            Sig: Inhale 2 puffs into the lungs every 6 hours  '

## 2023-02-21 NOTE — NURSING NOTE
Patient started feeling ill on Sat evening - very tired, cough and ST.  Pre-Visit Screening not done today.  Pulse - regular  My Chart - accepts    CLASSIFICATION OF OVERWEIGHT AND OBESITY BY BMI                         Obesity Class           BMI(kg/m2)  Underweight                                    < 18.5  Normal                                         18.5-24.9  Overweight                                     25.0-29.9  OBESITY                     I                  30.0-34.9                              II                 35.0-39.9  EXTREME OBESITY             III                >40                             Patient's  BMI Body mass index is 28.25 kg/(m^2).  http://hin.nhlbi.nih.gov/menuplanner/menu.cgi  Questioned patient about current smoking habits.  Pt. has never smoked.       [Well Nourished] : well nourished [Well Developed] : well developed [Alert] : ~L alert [Active] : active [Normal Breathing Pattern] : normal breathing pattern [No Respiratory Distress] : no respiratory distress [No Allergic Shiners] : no allergic shiners [No Drainage] : no drainage [No Conjunctivitis] : no conjunctivitis [Tympanic Membranes Clear] : tympanic membranes were clear [Nasal Mucosa Non-Edematous] : nasal mucosa non-edematous [No Nasal Drainage] : no nasal drainage [No Polyps] : no polyps [No Sinus Tenderness] : no sinus tenderness [No Oral Pallor] : no oral pallor [No Oral Cyanosis] : no oral cyanosis [Non-Erythematous] : non-erythematous [No Exudates] : no exudates [No Postnasal Drip] : no postnasal drip [No Tonsillar Enlargement] : no tonsillar enlargement [Absence Of Retractions] : absence of retractions [Symmetric] : symmetric [Good Expansion] : good expansion [No Acc Muscle Use] : no accessory muscle use [Good aeration to bases] : good aeration to bases [Equal Breath Sounds] : equal breath sounds bilaterally [No Crackles] : no crackles [No Rhonchi] : no rhonchi [No Wheezing] : no wheezing [Normal Sinus Rhythm] : normal sinus rhythm [No Heart Murmur] : no heart murmur [Soft, Non-Tender] : soft, non-tender [No Hepatosplenomegaly] : no hepatosplenomegaly [Non Distended] : was not ~L distended [Abdomen Mass (___ Cm)] : no abdominal mass palpated [Full ROM] : full range of motion [No Clubbing] : no clubbing [Capillary Refill < 2 secs] : capillary refill less than two seconds [No Cyanosis] : no cyanosis [No Petechiae] : no petechiae [No Kyphoscoliosis] : no kyphoscoliosis [No Contractures] : no contractures [Alert and  Oriented] : alert and oriented [No Abnormal Focal Findings] : no abnormal focal findings [Normal Muscle Tone And Reflexes] : normal muscle tone and reflexes [No Birth Marks] : no birth marks [No Rashes] : no rashes [No Skin Lesions] : no skin lesions

## 2023-03-10 ENCOUNTER — TRANSFERRED RECORDS (OUTPATIENT)
Dept: FAMILY MEDICINE | Facility: CLINIC | Age: 58
End: 2023-03-10

## 2023-03-26 ENCOUNTER — HEALTH MAINTENANCE LETTER (OUTPATIENT)
Age: 58
End: 2023-03-26

## 2023-04-14 ENCOUNTER — TRANSFERRED RECORDS (OUTPATIENT)
Dept: FAMILY MEDICINE | Facility: CLINIC | Age: 58
End: 2023-04-14

## 2023-05-09 ENCOUNTER — OFFICE VISIT (OUTPATIENT)
Dept: FAMILY MEDICINE | Facility: CLINIC | Age: 58
End: 2023-05-09

## 2023-05-09 ENCOUNTER — MYC MEDICAL ADVICE (OUTPATIENT)
Dept: FAMILY MEDICINE | Facility: CLINIC | Age: 58
End: 2023-05-09

## 2023-05-09 VITALS
TEMPERATURE: 97.9 F | OXYGEN SATURATION: 98 % | BODY MASS INDEX: 30.52 KG/M2 | HEIGHT: 71 IN | WEIGHT: 218 LBS | HEART RATE: 55 BPM | SYSTOLIC BLOOD PRESSURE: 140 MMHG | DIASTOLIC BLOOD PRESSURE: 78 MMHG

## 2023-05-09 DIAGNOSIS — J45.20 MILD INTERMITTENT ASTHMA WITHOUT COMPLICATION: ICD-10-CM

## 2023-05-09 PROCEDURE — 99214 OFFICE O/P EST MOD 30 MIN: CPT | Performed by: PHYSICIAN ASSISTANT

## 2023-05-09 RX ORDER — ALBUTEROL SULFATE 90 UG/1
2 AEROSOL, METERED RESPIRATORY (INHALATION) EVERY 6 HOURS
Qty: 18 G | Refills: 1 | Status: SHIPPED | OUTPATIENT
Start: 2023-05-09

## 2023-05-09 RX ORDER — GABAPENTIN 300 MG/1
300 CAPSULE ORAL 2 TIMES DAILY
COMMUNITY
Start: 2022-10-24 | End: 2023-11-02

## 2023-05-09 NOTE — NURSING NOTE
"Chief Complaint   Patient presents with     Recheck Medication     Allergy inhaler refill             Leg Pain     Pt has had injections in his back for leg pain in the past-- takes gabapentin  Has severe bilateral leg pain  Has a doctor for his broken ankle     Eye Problem     Behind right eye has a pulsating or tapping feel  Can feel his heart beat in head when he is laying down  Right side of head/brain feels \"off\"  Sees ENT for sinus           Pre-visit Screening:  Immunizations:  up to date  Colonoscopy:  is up to date  Mammogram: NA  Asthma Action Test/Plan:  NA  PHQ9:  NA  GAD7:  NA  Questioned patient about current smoking habits Pt. has never smoked.  Ok to leave detailed message on voice mail for today's visit only Yes, phone # [phone]        "

## 2023-05-09 NOTE — PROGRESS NOTES
"CC: Several Concerns    History:  Eye Concern:  Getting a pulsing/tapping sensation behind right eye for 2-3 months. Seems to be getting more pronounced. When trying to read focus is normal, but can feel the twitching.There most of the time. Denies any signficant stress or tension recently. No injury, fall.  Does have an ENT that he sees for chronic right sinus symptoms as recently as 3/2023. They had ordered a CT scan, but he never got a call to have this completed. Went on vacation 2 weeks ago and right sinus got particularly painful, but now seems somewhat better. Does have strong family history of hypertension. Has been several years since his last eye exam, and he does feel like he sees halos around lights since he had lasik surgery.     Chest tightness:  Gets chest tightness seasonally with allergies when snow melts, but this year has persisted longer. Uses albuterol as needed, usually 1-2 times daily for the past 2 months. Does not think he has asthma in general. Would like refill of albuterol. Does not take daily antihistamine. Does use budesonide nasal spray.     Leg pain:  Having severe bilateral leg pain throughout both upper and lower legs, and foot as well. Happens after sitting still, and gets better with moving. Had low back injection 3 weeks ago through [place], which have helped with sciatic pain he had been having on back of legs, but has not helped more general leg pain. They had brought up doing a repeat MRI of lumbar spine as previous was several years old. Also takes gabapentin 300 mg twice daily.     PMH, MEDICATIONS, ALLERGIES, SOCIAL AND FAMILY HISTORY in [facility] and reviewed by me personally.    ROS negative other than the symptoms noted above in the HPI.    Examination   BP (!) 140/78 (BP Location: Right arm, Patient Position: Sitting, Cuff Size: Adult Large)   Pulse 55   Temp 97.9  F (36.6  C) (Temporal)   Ht 1.81 m (5' 11.25\")   Wt 98.9 kg (218 lb)   SpO2 98%   BMI 30.19 kg/m   "     Constitutional: Sitting comfortably, in no acute distress. Vital signs noted  Eyes: pupils equal round reactive to light and accomodation, extra ocular movements intact  Ears: external canals and TMs free of abnormalities  Nose: patent, without mucosal abnormalities  Mouth and throat: without erythema or lesions of the mucosa  Neck:  no adenopathy, trachea midline and normal to palpation, thyroid normal to palpation  Cardiovascular:  regular rate and rhythm, no murmurs, clicks, or gallops  Respiratory:  normal respiratory rate and rhythm, lungs clear to auscultation  SKIN: No jaundice/pallor/rash.   Psychiatric: mentation appears normal and affect normal/bright    A/P    ICD-10-CM    1. Mild intermittent asthma without complication  J45.20 albuterol (PROAIR HFA/PROVENTIL HFA/VENTOLIN HFA) 108 (90 Base) MCG/ACT inhaler          DISCUSSION:  History:  Eye Concern:  Suspect this is related to his chronic right sinus symptoms. Recommended contact ENT to pursue CT scan they recommended. Also should update eye exam as it has been years, and it is possible vision strain is causing the pulsing/twitching. Go to ER with any significant worsening.     Elevated BP, Chest tightness:  Pt has elevated BP today. May be related to chest pressure or possibly an effect of the albuterol use, but didn't take albuterol today. Agree to refill albuterol, but recommended consider a daily antihistamine like Claritin or Allegra. Also recommended he purchase BP cuff to use at home. Contact me in 1 week with updated BPs with goal <130/80.     Leg pain:  I agree with spine specialist that updated lumbar MRI would be warranted as symptoms not responding to injection.    follow up visit: 1-2 weeks    Time of visit: 40 minutes    Melissa Way PA-C  [facility]

## 2023-05-30 ENCOUNTER — OFFICE VISIT (OUTPATIENT)
Dept: FAMILY MEDICINE | Facility: CLINIC | Age: 58
End: 2023-05-30

## 2023-05-30 VITALS
OXYGEN SATURATION: 98 % | DIASTOLIC BLOOD PRESSURE: 82 MMHG | BODY MASS INDEX: 30.38 KG/M2 | HEART RATE: 52 BPM | SYSTOLIC BLOOD PRESSURE: 120 MMHG | WEIGHT: 217 LBS | HEIGHT: 71 IN | TEMPERATURE: 97.3 F

## 2023-05-30 DIAGNOSIS — G89.29 CHRONIC HEAD PAIN: Primary | ICD-10-CM

## 2023-05-30 DIAGNOSIS — R41.89 COGNITIVE CHANGE: ICD-10-CM

## 2023-05-30 DIAGNOSIS — R51.9 CHRONIC HEAD PAIN: Primary | ICD-10-CM

## 2023-05-30 PROCEDURE — 99213 OFFICE O/P EST LOW 20 MIN: CPT | Performed by: PHYSICIAN ASSISTANT

## 2023-05-30 NOTE — NURSING NOTE
Chief Complaint   Patient presents with     head pain     Following up from head pain  Since last visit had CT scan of sinus ordered from ENT  Pain is always on the right side       Pre-visit Screening:  Immunizations:  up to date  Colonoscopy:  is up to date  Mammogram: na  Asthma Action Test/Plan:  yes  PHQ9:  PhQ 2 done today  GAD7:  na  Questioned patient about current smoking habits Pt. has never smoked.  Ok to leave detailed message on voice mail for today's visit only yes, phone # [phone]

## 2023-05-30 NOTE — PROGRESS NOTES
"CC: Head pressure    History:  Raul returns today to follow-up from 5/9/2023 visit where he was having pulsing sensation behind right eye for 3 months. Also had right head pain above right ear for 1 year. I had recommended close BP monitor, monitor for possible stress component, get eye exam, and follow-up with CT scan recommended by ENT for chronic right sided sinus symptoms.     Since that time, has been monitoring BP and getting 120s-mid 130s/60s-82. The pain only on right side above ear has continued constantly. Feels like it is altering ability to think clearly.  Feels like if he moves his neck up and to the right he could fall asleep.     He did end up having sinus CT scan through ENT that was normal. But still getting daily yellow/green/bloody discharge worse in the morning. Plans to follow-up with ENT to further evaluate drainage. Has been doing Rhinocort.  PMH, MEDICATIONS, ALLERGIES, SOCIAL AND FAMILY HISTORY in [facility] and reviewed by me personally.    ROS negative other than the symptoms noted above in the HPI.      Examination   /82 (BP Location: Left arm, Patient Position: Sitting, Cuff Size: Adult Large)   Pulse 52   Temp 97.3  F (36.3  C) (Temporal)   Ht 1.81 m (5' 11.25\")   Wt 98.4 kg (217 lb)   SpO2 98%   BMI 30.05 kg/m       Constitutional: Sitting comfortably, in no acute distress. Vital signs noted  Eyes: pupils equal round reactive to light and accomodation, extra ocular movements intact  Neck:  no adenopathy, trachea midline and normal to palpation, thyroid normal to palpation  Cardiovascular:  regular rate and rhythm, no murmurs, clicks, or gallops  Respiratory:  normal respiratory rate and rhythm, lungs clear to auscultation  NEURO:  cranial nerves 2-12 intact, gait, including heel, toe, and tandem walking normal, muscle strength normal, rapid alternating movements normal, sensation to light touch and pinprick normal, proprioception normal, reflexes normal and symmetric  SKIN: No " jaundice/pallor/rash.   Psychiatric: mentation appears normal and affect normal/bright      A/P    ICD-10-CM    1. Chronic head pain  R51.9     G89.29       2. Cognitive change  R41.89           DISCUSSION:  Reassured by normal neurological exam, stable symptoms. However, symptoms are persistent and becoming more bothersome to pt, interfering with quality of life. Agreed to referral to neurology for further evaluation, possible imaging.     follow up visit: As needed    Melissa Way PA-C  [facility]

## 2023-06-02 ENCOUNTER — TRANSFERRED RECORDS (OUTPATIENT)
Dept: FAMILY MEDICINE | Facility: CLINIC | Age: 58
End: 2023-06-02

## 2023-06-30 ENCOUNTER — MYC MEDICAL ADVICE (OUTPATIENT)
Dept: FAMILY MEDICINE | Facility: CLINIC | Age: 58
End: 2023-06-30

## 2023-06-30 DIAGNOSIS — J45.20 MILD INTERMITTENT ASTHMA WITHOUT COMPLICATION: ICD-10-CM

## 2023-06-30 RX ORDER — ALBUTEROL SULFATE 90 UG/1
2 AEROSOL, METERED RESPIRATORY (INHALATION) EVERY 6 HOURS
Qty: 18 G | Refills: 1 | Status: CANCELLED | OUTPATIENT
Start: 2023-06-30

## 2023-06-30 NOTE — TELEPHONE ENCOUNTER
Has pt already used refill from 5/2023 script. May need to return to do breathing tests to check for asthma and consider further inhalers to help with breathing.

## 2023-06-30 NOTE — TELEPHONE ENCOUNTER
Received refill request for pt's albuterol inhaler, please advise.    Dmitriy Saunders is requesting a refill of:    Pending Prescriptions:                       Disp   Refills    albuterol (PROAIR HFA/PROVENTIL HFA/NETTE*18 g   1            Sig: Inhale 2 puffs into the lungs every 6 hours

## 2023-07-11 ENCOUNTER — TRANSFERRED RECORDS (OUTPATIENT)
Dept: FAMILY MEDICINE | Facility: CLINIC | Age: 58
End: 2023-07-11

## 2023-07-12 ENCOUNTER — TRANSFERRED RECORDS (OUTPATIENT)
Dept: FAMILY MEDICINE | Facility: CLINIC | Age: 58
End: 2023-07-12

## 2023-07-12 ENCOUNTER — OFFICE VISIT (OUTPATIENT)
Dept: FAMILY MEDICINE | Facility: CLINIC | Age: 58
End: 2023-07-12

## 2023-07-12 VITALS
HEART RATE: 56 BPM | SYSTOLIC BLOOD PRESSURE: 118 MMHG | OXYGEN SATURATION: 98 % | WEIGHT: 212 LBS | DIASTOLIC BLOOD PRESSURE: 64 MMHG | TEMPERATURE: 97.6 F | BODY MASS INDEX: 29.68 KG/M2 | HEIGHT: 71 IN

## 2023-07-12 DIAGNOSIS — L30.9 DERMATITIS: Primary | ICD-10-CM

## 2023-07-12 PROCEDURE — 99213 OFFICE O/P EST LOW 20 MIN: CPT | Performed by: PHYSICIAN ASSISTANT

## 2023-07-12 RX ORDER — TRIAMCINOLONE ACETONIDE 1 MG/G
CREAM TOPICAL 2 TIMES DAILY
Qty: 30 G | Refills: 0 | Status: SHIPPED | OUTPATIENT
Start: 2023-07-12 | End: 2023-11-13

## 2023-07-12 NOTE — NURSING NOTE
Chief Complaint   Patient presents with     Derm Problem     Rash on both lower legs, started on the right starting to spread on the left. [place]     Cleaned it with rubbing alcohol, hydrogen peroxide, then has tried hydrocodone, calamine.        Pre-visit Screening:  Immunizations:  up to date  Colonoscopy:  is up to date  Mammogram: na  Asthma Action Test/Plan:  [place] Declined Asthma action test.  PHQ9:  NA  GAD7:  NA  Questioned patient about current smoking habits Pt. has never smoked.  Ok to leave detailed message on voice mail for today's visit only Yes, phone # [phone]

## 2023-07-12 NOTE — PROGRESS NOTES
Assessment & Plan     Dermatitis- Pt and I agree this is likely dermatitis due to possible exposure [place] this [place] and appearance of rash. No signs of infection at this time.   - triamcinolone (KENALOG) 0.1 % external cream  Dispense: 30 g; Refill: 0    - Follow up if worsening.   - Monitor for any signs of infection such as fevers, chills, pus from the wound-call if this occurs  -Stop rubbing EtOH    Follow up as needed.   Eladio Navarrete, BEBO  [facility]    Subjective   Raul is a 58 year old, presenting for the following health issues:  Derm Problem (Rash on both lower legs, started on the right starting to spread on the left. [place] //Cleaned it with rubbing alcohol, hydrogen peroxide, then has tried hydrocodone, calamine. )    HPI   Rash  Onset/Duration: Rash started around Sunday [place], rash has been getting worse. Patient thinks he possibly has a scratch before the rash appeared and something irritated the scratch, causing the rash.    Description  Location: R lower calf   Character: raised, red, blanches to palpitation, not well circumscribed   Itching: severe  Intensity:  severe  Progression of Symptoms:  worsening  Accompanying signs and symptoms:   Fever: No  Body aches or joint pain: No  Sore throat symptoms: No  Recent cold symptoms: No  History:           Previous episodes of similar rash: None  New exposures:  None  Recent travel: No  Exposure to similar rash: No  Precipitating or alleviating factors:   Therapies tried and outcome:  - calamine, hydrocortisone, neosporin, and rubbing alcohol   - some relief with all of it     History of gluten allergy - rash     Review of Systems   Constitutional, HEENT, cardiovascular, pulmonary, gi and gu systems are negative, except as otherwise noted.      Objective    /64 (BP Location: Right arm, Patient Position: Sitting, Cuff Size: Adult  "Large)   Pulse 56   Temp 97.6  F (36.4  C)   Ht 1.803 m (5' 11\")   Wt 96.2 kg (212 lb)   SpO2 98%   BMI 29.57 kg/m    Body mass index is 29.57 kg/m .  Physical Exam   GENERAL: healthy, alert and no distress  RESP: lungs clear to auscultation - no rales, rhonchi or wheezes  CV: regular rate and rhythm, normal S1 S2, no S3 or S4, no murmur, click or rub, no peripheral edema  ABDOMEN: soft, nontender, no hepatosplenomegaly, no masses and bowel sounds normal  MS: no gross musculoskeletal defects noted, no edema  SKIN: R lateral leg- 7cm x 3.5cm lesion, erythematous, irregular, small bumps, raised apperance, pruritic  PSYCH: mentation appears normal, affect normal/bright        " ADMIT

## 2023-08-01 ENCOUNTER — HOSPITAL ENCOUNTER (EMERGENCY)
Facility: CLINIC | Age: 58
Discharge: LEFT BEFORE DISCHARGE | End: 2023-08-01
Admitting: EMERGENCY MEDICINE
Payer: COMMERCIAL

## 2023-08-01 VITALS
OXYGEN SATURATION: 99 % | DIASTOLIC BLOOD PRESSURE: 93 MMHG | RESPIRATION RATE: 18 BRPM | HEART RATE: 65 BPM | SYSTOLIC BLOOD PRESSURE: 147 MMHG | TEMPERATURE: 98.1 F

## 2023-08-01 PROCEDURE — 99281 EMR DPT VST MAYX REQ PHY/QHP: CPT

## 2023-08-01 NOTE — ED TRIAGE NOTES
Patient reports left sided abdominal pain that started about 1/2 hour prior to arrival. Patient reports nausea but no vomiting.  Patient denies urinary issues.  Patient states he had a normal BM today.      Triage Assessment       Row Name 08/01/23 [id number]       Triage Assessment (Adult)    Airway WDL WDL       Respiratory WDL    Respiratory WDL WDL       Skin Circulation/Temperature WDL    Skin Circulation/Temperature WDL WDL       Cardiac WDL    Cardiac WDL WDL       Peripheral/Neurovascular WDL    Peripheral Neurovascular WDL WDL       Cognitive/Neuro/Behavioral WDL    Cognitive/Neuro/Behavioral WDL WDL

## 2023-10-28 ENCOUNTER — HOSPITAL ENCOUNTER (EMERGENCY)
Facility: CLINIC | Age: 58
Discharge: HOME | End: 2023-10-28
Attending: EMERGENCY MEDICINE | Admitting: EMERGENCY MEDICINE
Payer: COMMERCIAL

## 2023-10-28 VITALS
OXYGEN SATURATION: 98 % | DIASTOLIC BLOOD PRESSURE: 84 MMHG | SYSTOLIC BLOOD PRESSURE: 130 MMHG | HEIGHT: 72 IN | WEIGHT: 210 LBS | TEMPERATURE: 98.7 F | BODY MASS INDEX: 28.44 KG/M2 | HEART RATE: 58 BPM | RESPIRATION RATE: 18 BRPM

## 2023-10-28 DIAGNOSIS — R10.31 RLQ ABDOMINAL PAIN: ICD-10-CM

## 2023-10-28 DIAGNOSIS — M54.41 ACUTE RIGHT-SIDED LOW BACK PAIN WITH RIGHT-SIDED SCIATICA: ICD-10-CM

## 2023-10-28 LAB
ALBUMIN UR-MCNC: NEGATIVE MG/DL
ANION GAP SERPL CALCULATED.3IONS-SCNC: 9 MMOL/L (ref 7–15)
APPEARANCE UR: CLEAR
BASOPHILS # BLD AUTO: 0 10E3/UL (ref 0–0.2)
BASOPHILS NFR BLD AUTO: 1 %
BILIRUB UR QL STRIP: NEGATIVE
BUN SERPL-MCNC: 9.8 MG/DL (ref 6–20)
CALCIUM SERPL-MCNC: 9.5 MG/DL (ref 8.6–10)
CHLORIDE SERPL-SCNC: 102 MMOL/L (ref 98–107)
COLOR UR AUTO: ABNORMAL
CREAT SERPL-MCNC: 1.29 MG/DL (ref 0.67–1.17)
DEPRECATED HCO3 PLAS-SCNC: 27 MMOL/L (ref 22–29)
EGFRCR SERPLBLD CKD-EPI 2021: 64 ML/MIN/1.73M2
EOSINOPHIL # BLD AUTO: 0.2 10E3/UL (ref 0–0.7)
EOSINOPHIL NFR BLD AUTO: 3 %
ERYTHROCYTE [DISTWIDTH] IN BLOOD BY AUTOMATED COUNT: 12.5 % (ref 10–15)
GLUCOSE SERPL-MCNC: 103 MG/DL (ref 70–99)
GLUCOSE UR STRIP-MCNC: NEGATIVE MG/DL
HCT VFR BLD AUTO: 41.8 % (ref 40–53)
HGB BLD-MCNC: 14 G/DL (ref 13.3–17.7)
HGB UR QL STRIP: NEGATIVE
HOLD SPECIMEN: NORMAL
HOLD SPECIMEN: NORMAL
IMM GRANULOCYTES # BLD: 0 10E3/UL
IMM GRANULOCYTES NFR BLD: 0 %
KETONES UR STRIP-MCNC: NEGATIVE MG/DL
LEUKOCYTE ESTERASE UR QL STRIP: NEGATIVE
LYMPHOCYTES # BLD AUTO: 1.4 10E3/UL (ref 0.8–5.3)
LYMPHOCYTES NFR BLD AUTO: 23 %
MAGNESIUM SERPL-MCNC: 2.2 MG/DL (ref 1.7–2.3)
MCH RBC QN AUTO: 28.6 PG (ref 26.5–33)
MCHC RBC AUTO-ENTMCNC: 33.5 G/DL (ref 31.5–36.5)
MCV RBC AUTO: 85 FL (ref 78–100)
MONOCYTES # BLD AUTO: 0.5 10E3/UL (ref 0–1.3)
MONOCYTES NFR BLD AUTO: 8 %
NEUTROPHILS # BLD AUTO: 3.9 10E3/UL (ref 1.6–8.3)
NEUTROPHILS NFR BLD AUTO: 65 %
NITRATE UR QL: NEGATIVE
NRBC # BLD AUTO: 0 10E3/UL
NRBC BLD AUTO-RTO: 0 /100
PH UR STRIP: 7.5 [PH] (ref 5–7)
PLATELET # BLD AUTO: 217 10E3/UL (ref 150–450)
POTASSIUM SERPL-SCNC: 4.2 MMOL/L (ref 3.4–5.3)
RBC # BLD AUTO: 4.9 10E6/UL (ref 4.4–5.9)
RBC URINE: <1 /HPF
SODIUM SERPL-SCNC: 138 MMOL/L (ref 135–145)
SP GR UR STRIP: 1.01 (ref 1–1.03)
UROBILINOGEN UR STRIP-MCNC: NORMAL MG/DL
WBC # BLD AUTO: 6 10E3/UL (ref 4–11)
WBC URINE: 2 /HPF

## 2023-10-28 PROCEDURE — 96374 THER/PROPH/DIAG INJ IV PUSH: CPT | Mod: 59

## 2023-10-28 PROCEDURE — 85025 COMPLETE CBC W/AUTO DIFF WBC: CPT | Performed by: EMERGENCY MEDICINE

## 2023-10-28 PROCEDURE — 250N000011 HC RX IP 250 OP 636: Performed by: EMERGENCY MEDICINE

## 2023-10-28 PROCEDURE — 99284 EMERGENCY DEPT VISIT MOD MDM: CPT | Mod: 25

## 2023-10-28 PROCEDURE — 81001 URINALYSIS AUTO W/SCOPE: CPT | Performed by: EMERGENCY MEDICINE

## 2023-10-28 PROCEDURE — 83735 ASSAY OF MAGNESIUM: CPT | Performed by: EMERGENCY MEDICINE

## 2023-10-28 PROCEDURE — 36415 COLL VENOUS BLD VENIPUNCTURE: CPT | Performed by: EMERGENCY MEDICINE

## 2023-10-28 PROCEDURE — 80048 BASIC METABOLIC PNL TOTAL CA: CPT | Performed by: EMERGENCY MEDICINE

## 2023-10-28 RX ORDER — HYDROMORPHONE HYDROCHLORIDE 1 MG/ML
0.5 INJECTION, SOLUTION INTRAMUSCULAR; INTRAVENOUS; SUBCUTANEOUS ONCE
Status: COMPLETED | OUTPATIENT
Start: 2023-10-28 | End: 2023-10-28

## 2023-10-28 RX ADMIN — HYDROMORPHONE HYDROCHLORIDE 0.5 MG: 1 INJECTION, SOLUTION INTRAMUSCULAR; INTRAVENOUS; SUBCUTANEOUS at 19:59

## 2023-10-28 ASSESSMENT — ACTIVITIES OF DAILY LIVING (ADL): ADLS_ACUITY_SCORE: 33

## 2023-10-28 NOTE — ED TRIAGE NOTES
Pt was at cabin around [place]. While using a skid steer 2 days ago, and began having severe R flank pain and RLQ abdominal pain. Pt states kidney stone 15 years ago. Pt seen at [place] ED CT did not show any stone. Pt prescribed flexeril and tramadol without relief. Pt states pain increased and now c/o R hip numbness , and without numbness or tingling BLE. Denies fevers or vomiting. Pt states straining increases pain and pain severe with palpation RLQ. Denies stool changes. ABC in tact. A/OX4

## 2023-10-29 NOTE — ED PROVIDER NOTES
History     Chief Complaint:  No chief complaint on file.       HPI   Dmitriy Saunders is a 58 year old male presenting with right lower quadrant pain right flank pain that began a couple days ago.  It has been progressively worsening.  Today he noted right hip numbness.  He was evaluated at the emergency room in [place] yesterday.  The patient states he had a negative work-up for kidney stone and appendicitis.  He was prescribed Flexeril and tramadol.  His symptoms or not improving.  He states he had a kidney stone 15 years ago.  He was diagnosed with pneumonia and prescribed doxycycline.  He does have a cough.  No fever, chills, sore throat, chest pain, shortness of breath, nausea, vomiting, hematuria, dysuria, constipation, diarrhea, urinary or bowel retention or incontinence, weakness of the extremities, saddle anesthesia.  He reports he is very active, but denies any falls or injuries.  No prior abdominal surgeries.      Independent Historian:    Patient only    Review of External Notes:  NA      Medications:    albuterol (PROAIR HFA/PROVENTIL HFA/VENTOLIN HFA) 108 (90 Base) MCG/ACT inhaler  budesonide (RINOCORT AQUA) 32 MCG/ACT nasal spray  gabapentin (NEURONTIN) 300 MG capsule  LANsoprazole (PREVACID) 30 MG DR capsule  Omega-3 Fatty Acids (FISH OIL) 500 MG CAPS  Probiotic Product (PROBIOTIC-10 PO)  psyllium (METAMUCIL/KONSYL) Packet  triamcinolone (KENALOG) 0.1 % external cream        Past Medical History:    Past Medical History:   Diagnosis Date    Asthma     Eczema     Seasonal allergic rhinitis        Past Surgical History:    Past Surgical History:   Procedure Laterality Date    colonoscopy  2017    repeat 2027    ENT SURGERY      ORTHOPEDIC SURGERY            Physical Exam   Patient Vitals for the past 24 hrs:   BP Temp Temp src Pulse Resp SpO2 Height Weight   10/28/23 2041 -- -- -- -- -- 98 % -- --   10/28/23 2040 -- -- -- -- -- 98 % -- --   10/28/23 2039 -- -- -- -- -- 97 % -- --   10/28/23 2038 --  -- -- -- -- 97 % -- --   10/28/23 2037 -- -- -- -- -- 98 % -- --   10/28/23 2036 -- -- -- -- -- 98 % -- --   10/28/23 1854 130/84 98.7  F (37.1  C) Temporal 58 18 100 % 1.829 m (6') 95.3 kg (210 lb)        Physical Exam  General: Resting on the bed.  Head: No obvious trauma to head.  Ears, Nose, Throat:  External ears normal.  Nose normal.  No pharyngeal erythema, swelling or exudate.  Midline uvula. Moist mucus membranes.  Eyes:  Conjunctivae clear.   Neck: Normal range of motion.  Neck supple.   CV: Regular rate and rhythm.  No murmurs.      Respiratory: Effort normal and breath sounds normal.  No wheezing or crackles.   Gastrointestinal: Soft.  No distension.  Right lower quadrant abdominal pain to palpation.  There is no rigidity, no rebound and no guarding.   : Normal-appearing external genitalia.  No rash noted.  No blood or discharge from the meatus.  No tenderness or swelling of the testicles.  Musculoskeletal: No midline spinal tenderness to palpation.  Normal range of motion.  Non tender extremities to palpations.  Tenderness to palpation at the right SI joint.  Neuro: Alert. Moving all extremities appropriately.  Normal speech.    Skin: Skin is warm and dry.  No rash noted.   Psych: Normal mood and affect. Behavior is normal.       Emergency Department Course       Laboratory:  Labs Ordered and Resulted from Time of ED Arrival to Time of ED Departure   ROUTINE UA WITH MICROSCOPIC REFLEX TO CULTURE - Abnormal       Result Value    Color Urine Straw      Appearance Urine Clear      Glucose Urine Negative      Bilirubin Urine Negative      Ketones Urine Negative      Specific Gravity Urine 1.007      Blood Urine Negative      pH Urine 7.5 (*)     Protein Albumin Urine Negative      Urobilinogen Urine Normal      Nitrite Urine Negative      Leukocyte Esterase Urine Negative      RBC Urine <1      WBC Urine 2     BASIC METABOLIC PANEL - Abnormal    Sodium 138      Potassium 4.2      Chloride 102      Carbon  Dioxide (CO2) 27      Anion Gap 9      Urea Nitrogen 9.8      Creatinine 1.29 (*)     GFR Estimate 64      Calcium 9.5      Glucose 103 (*)    MAGNESIUM - Normal    Magnesium 2.2     CBC WITH PLATELETS AND DIFFERENTIAL    WBC Count 6.0      RBC Count 4.90      Hemoglobin 14.0      Hematocrit 41.8      MCV 85      MCH 28.6      MCHC 33.5      RDW 12.5      Platelet Count 217      % Neutrophils 65      % Lymphocytes 23      % Monocytes 8      % Eosinophils 3      % Basophils 1      % Immature Granulocytes 0      NRBCs per 100 WBC 0      Absolute Neutrophils 3.9      Absolute Lymphocytes 1.4      Absolute Monocytes 0.5      Absolute Eosinophils 0.2      Absolute Basophils 0.0      Absolute Immature Granulocytes 0.0      Absolute NRBCs 0.0          Procedures   NA    Emergency Department Course & Assessments:             Interventions:  Medications   HYDROmorphone (PF) (DILAUDID) injection 0.5 mg (0.5 mg Intravenous $Given 10/28/23 1959)        Assessments:  1922 -initial evaluation assessment patient  2041 -reevaluated the patient.  He reports his pain is resolved    Independent Interpretation (X-rays, CTs, rhythm strip):  None    Consultations/Discussion of Management or Tests:  None       Social Determinants of Health affecting care:  None     Disposition:  The patient was discharged to home.     Impression & Plan    CMS Diagnoses: None      Medical Decision Making:  Patient appears uncomfortable on exam.  He has no neurological deficits.  He has pain at the right SI joint, and right lower quadrant.  CBC, BMP, magnesium, UA are unremarkable.  We were able to obtain the imaging that the patient received while in the emergency department immediately.  CT abdomen and pelvis showed right lower lobe infiltrate, for which the patient is currently receiving antibiotics for.  CT was negative for kidney stone, appendicitis or any other acute intra-abdominal pathology.  X-ray of the lumbar spine is also unremarkable.  Given  that the patient has no recent falls or trauma, a fracture is unlikely, so a CT does not seem necessary.  He has a long history of lower back injections, secondary to chronic pain.  He has no other neurological deficits, and an MRI does not seem necessary.  The patient was given a dose of Dilaudid.  Upon reevaluation, the patient reports his pain has resolved.  He admits to not taking the Flexeril and the tramadol, which was prescribed by the provider in [place], as prescribed.  He has not been taking it as frequently as prescribed.  He is encouraged to take these medications as prescribed, as well as the antibiotics he was prescribed.  He states he used to attend physical therapy for lower back pain.  He is encouraged to contact the physical therapy office for another appointment.  He is also instructed to try lidocaine patches, heat and/or ice, and to follow-up with his provider that has previously conducted blocks for his low back pain.  Return precautions are given and he verbalizes understanding.  He is discharged home in stable condition with his wife.        Diagnosis:    ICD-10-CM    1. Acute right-sided low back pain with right-sided sciatica  M54.41       2. RLQ abdominal pain  R10.31            Discharge Medications:  Discharge Medication List as of 10/28/2023  8:46 PM             Lorne Jiménez MD  10/28/2023   Lorne Jiménez MD Peery, Stephen, MD  10/29/23 0201

## 2023-10-29 NOTE — DISCHARGE INSTRUCTIONS
Continue taking your medication as prescribed.  You can also try heat and/or ice.  20 minutes on, 20 minutes off and repeat.  You can also try lidocaine patches.  We recommend that you resume physical therapy.  Also follow-up with your provider that performed the injection for you in the past.  Please return to the emergency department if you have any new or concerning symptoms.

## 2023-11-02 ENCOUNTER — OFFICE VISIT (OUTPATIENT)
Dept: FAMILY MEDICINE | Facility: CLINIC | Age: 58
End: 2023-11-02

## 2023-11-02 VITALS
HEIGHT: 72 IN | WEIGHT: 210 LBS | TEMPERATURE: 97.8 F | SYSTOLIC BLOOD PRESSURE: 132 MMHG | OXYGEN SATURATION: 99 % | DIASTOLIC BLOOD PRESSURE: 82 MMHG | BODY MASS INDEX: 28.44 KG/M2 | HEART RATE: 63 BPM

## 2023-11-02 DIAGNOSIS — M54.50 ACUTE RIGHT-SIDED LOW BACK PAIN WITHOUT SCIATICA: Primary | ICD-10-CM

## 2023-11-02 PROCEDURE — 99213 OFFICE O/P EST LOW 20 MIN: CPT | Performed by: PHYSICIAN ASSISTANT

## 2023-11-02 RX ORDER — DOXYCYCLINE 100 MG/1
100 CAPSULE ORAL
COMMUNITY
Start: 2023-10-27 | End: 2024-01-10

## 2023-11-02 RX ORDER — KETOROLAC TROMETHAMINE 10 MG/1
10 TABLET, FILM COATED ORAL EVERY 6 HOURS PRN
Qty: 20 TABLET | Refills: 0 | Status: SHIPPED | OUTPATIENT
Start: 2023-11-02 | End: 2024-01-10

## 2023-11-02 RX ORDER — METHYLPREDNISOLONE 4 MG
TABLET, DOSE PACK ORAL
COMMUNITY
Start: 2023-10-30 | End: 2023-11-13

## 2023-11-02 RX ORDER — GABAPENTIN 300 MG/1
600 CAPSULE ORAL 2 TIMES DAILY
COMMUNITY
Start: 2023-11-02

## 2023-11-02 RX ORDER — CYCLOBENZAPRINE HCL 10 MG
10 TABLET ORAL
COMMUNITY
Start: 2023-10-27

## 2023-11-02 NOTE — PROGRESS NOTES
Assessment & Plan     Acute right-sided low back pain without sciatica-pt following up appropriately with ortho, I feel his request of short course of pain meds is reasonable given that he destroyed the Tramadol and is trying to avoid needing to go to the ED, will fill with expectation that ortho will handle future pain control  No further refills  Follow up with orthopedics  - ketorolac (TORADOL) 10 MG tablet  Dispense: 20 tablet; Refill: 0-impaired kidney function noted, still within safe range to use for short course        Follow up as needed    No follow-ups on file.    Eladio Navarrete, BEBO  [facility]    Subjective   Raul is a 58 year old, presenting for the following health issues:  ER F/U (RLQ abdominal pain that wrapped around to his side, went to ED in [place], [place] on 10[place]27 [place] his cabin was advised labs and CT were normal, was given doxycycline tramadol and flexeril, he came home and pain returned so he went back to [facility] on 10/28 was given hydromorphone which helped, on 10/29 he want to [facility] ED for a recheck the advised this may be due to his previous back issues, he did get worked in with his Dr. Rincon at [facility] and was able to get MRI )    HPI       ED/UC Followup:    Facility:  [place] [facility], [facility], [facility]  Date of visit: 10/27, 10/28, 10/29  Reason for visit: Low back pain  Current Status: better, on Flexeril, Medrol Dosepak, gabapentin    Feeling better on these drugs. Was given tramadol, told by [facility] this didn't work so he disposed of this. Hoping to get rescue meds in case issue returns. PDMP reviewed, no concerns. History reviewed and accurate in nursing note.     Had MRI done 3 days ago with [facility]. Seeing [facility] for consult on back tomorrow.      Review of Systems   Constitutional, HEENT, cardiovascular, pulmonary, gi and gu systems are negative, except as otherwise noted.      Objective    /82 (BP Location: Right arm, Patient Position: Sitting, Cuff  Size: Adult Large)   Pulse 63   Temp 97.8  F (36.6  C) (Temporal)   Ht 1.829 m (6')   Wt 95.3 kg (210 lb)   SpO2 99%   BMI 28.48 kg/m    Body mass index is 28.48 kg/m .  Physical Exam   GENERAL: healthy, alert and no distress  NECK: no adenopathy, no asymmetry, masses, or scars and thyroid normal to palpation  RESP: lungs clear to auscultation - no rales, rhonchi or wheezes  CV: regular rate and rhythm, normal S1 S2, no S3 or S4, no murmur, click or rub, no peripheral edema and peripheral pulses strong  ABDOMEN: soft, nontender, no hepatosplenomegaly, no masses and bowel sounds normal  NEURO: Normal strength and tone, mentation intact and speech normal  PSYCH: mentation appears normal, affect normal/bright

## 2023-11-02 NOTE — NURSING NOTE
Chief Complaint   Patient presents with    ER F/U     RLQ abdominal pain that wrapped around to his side, went to ED in [place], [place] on 10/27 [place] at his cabin was advised labs and CT were normal, was given doxycycline tramadol and flexeril, he came home and pain returned so he went back to [facility] on 10/28 was given hydromorphone which helped, on 10/29 he want to [facility] ED for a recheck the advised this may be due to his previous back issues, he did get worked in with his Dr. Rincon at [facility] and was able to get MRI      Pre-visit Screening:  Immunizations:  up to date  Colonoscopy:  is up to date  Mammogram: NA  Asthma Action Test/Plan:  NA  PHQ9:  NA  GAD7:  NA  Questioned patient about current smoking habits Pt. has never smoked.  Ok to leave detailed message on voice mail for today's visit only Yes, phone # [phone]

## 2023-11-06 ENCOUNTER — TRANSFERRED RECORDS (OUTPATIENT)
Dept: FAMILY MEDICINE | Facility: CLINIC | Age: 58
End: 2023-11-06

## 2023-11-13 ENCOUNTER — OFFICE VISIT (OUTPATIENT)
Dept: FAMILY MEDICINE | Facility: CLINIC | Age: 58
End: 2023-11-13

## 2023-11-13 VITALS
HEART RATE: 85 BPM | DIASTOLIC BLOOD PRESSURE: 78 MMHG | SYSTOLIC BLOOD PRESSURE: 126 MMHG | WEIGHT: 206 LBS | BODY MASS INDEX: 27.94 KG/M2 | OXYGEN SATURATION: 98 % | TEMPERATURE: 99.8 F

## 2023-11-13 DIAGNOSIS — J18.9 PNEUMONIA OF RIGHT LOWER LOBE DUE TO INFECTIOUS ORGANISM: Primary | ICD-10-CM

## 2023-11-13 PROCEDURE — 99214 OFFICE O/P EST MOD 30 MIN: CPT | Performed by: PHYSICIAN ASSISTANT

## 2023-11-13 RX ORDER — LEVOFLOXACIN 750 MG/1
750 TABLET, FILM COATED ORAL DAILY
Qty: 5 TABLET | Refills: 0 | Status: SHIPPED | OUTPATIENT
Start: 2023-11-13 | End: 2023-11-18

## 2023-11-13 NOTE — PROGRESS NOTES
Assessment & Plan     Pneumonia of right lower lobe due to infectious organism-concerns for ongoing pneumonia given some worsening, will treat with Levaquin given failure of doxycycline to improve symptoms  -Rest, increase fluids, honey for cough suppression/sore throat  -Ibuprofen/Tylenol as needed for symptomatic relief  Seek emergency care for significant shortness of breath, chest pain, and/or fever >103F that cannot be controlled with antipyretics   - levofloxacin (LEVAQUIN) 750 MG tablet  Dispense: 5 tablet; Refill: 0      Follow up as needed    No follow-ups on file.    Eladio Navarrete, BEBO  [facility]    Subjective   Raul is a 58 year old, presenting for the following health issues:  Cough (Pt has had a cough for over 4 weeks. Went to the ER in [place] about 2 weeks ago and they said he had pneumonia. When he coughs there is sometimes phlegm that comes up, it will be yellow, green and sometimes have pink in it. If he starts talking then the coughing starts. No headaches. Body aches. No nasal congestion, etc.)    HPI     Pt is overall worsening. Feeling fatigued, worsening cough (dry generally, but also having some green/yellow sputum). Dx pneumonia 10/27/23, finished 7 day course of doxycycline. No real improvement on this med. Denies fevers/chills. Significant coughing with any activity. No SOB with activity at this time. Dx RLL pneumonia.     Was on prednisone for back-week of 10/30/23. Had injection last week as well.     Review of Systems   Constitutional, HEENT, cardiovascular, pulmonary, gi and gu systems are negative, except as otherwise noted.      Objective    /78 (BP Location: Right arm, Patient Position: Sitting, Cuff Size: Adult Large)   Pulse 85   Temp 99.8  F (37.7  C) (Temporal)   Wt 93.4 kg (206 lb)   SpO2 98%   BMI 27.94 kg/m    Body mass index is 27.94 kg/m .  Physical Exam   GENERAL: healthy, alert and no distress  NECK: no adenopathy, no asymmetry, masses, or scars  and thyroid normal to palpation  RESP: RLL crackles, course breath sounds noted throughout  CV: regular rate and rhythm, normal S1 S2, no S3 or S4, no murmur, click or rub, no peripheral edema and peripheral pulses strong  ABDOMEN: soft, nontender, no hepatosplenomegaly, no masses and bowel sounds normal  MS: no gross musculoskeletal defects noted, no edema  NEURO: Normal strength and tone, mentation intact and speech normal  PSYCH: mentation appears normal, affect normal/bright

## 2023-11-13 NOTE — NURSING NOTE
Chief Complaint   Patient presents with    Cough     Pt has had a cough for over 4 weeks. Went to the ER in [place] about 2 weeks ago and they said he had pneumonia. When he coughs there is sometimes phlegm that comes up, it will be yellow, green and sometimes have pink in it. If he starts talking then the coughing starts. No headaches. Body aches. No nasal congestion, etc.    Pre-visit Screening:  Immunizations:  up to date  Colonoscopy:  is up to date  Mammogram: na  Asthma Action Test/Plan:  is due. Pt is here for an acute visit.  PHQ9:  na  GAD7:  na  Questioned patient about current smoking habits Pt. has never smoked.  Ok to leave detailed message on voice mail for today's visit only yes, phone # [phone]

## 2023-11-29 ENCOUNTER — TRANSFERRED RECORDS (OUTPATIENT)
Dept: FAMILY MEDICINE | Facility: CLINIC | Age: 58
End: 2023-11-29

## 2024-01-10 ENCOUNTER — OFFICE VISIT (OUTPATIENT)
Dept: FAMILY MEDICINE | Facility: CLINIC | Age: 59
End: 2024-01-10

## 2024-01-10 VITALS
SYSTOLIC BLOOD PRESSURE: 122 MMHG | WEIGHT: 211 LBS | HEART RATE: 58 BPM | TEMPERATURE: 97.9 F | BODY MASS INDEX: 28.62 KG/M2 | OXYGEN SATURATION: 99 % | DIASTOLIC BLOOD PRESSURE: 78 MMHG | RESPIRATION RATE: 20 BRPM

## 2024-01-10 DIAGNOSIS — B02.9 HERPES ZOSTER WITHOUT COMPLICATION: Primary | ICD-10-CM

## 2024-01-10 PROCEDURE — 90686 IIV4 VACC NO PRSV 0.5 ML IM: CPT | Performed by: STUDENT IN AN ORGANIZED HEALTH CARE EDUCATION/TRAINING PROGRAM

## 2024-01-10 PROCEDURE — 90471 IMMUNIZATION ADMIN: CPT | Performed by: STUDENT IN AN ORGANIZED HEALTH CARE EDUCATION/TRAINING PROGRAM

## 2024-01-10 PROCEDURE — 99213 OFFICE O/P EST LOW 20 MIN: CPT | Mod: 25 | Performed by: STUDENT IN AN ORGANIZED HEALTH CARE EDUCATION/TRAINING PROGRAM

## 2024-01-10 RX ORDER — VALACYCLOVIR HYDROCHLORIDE 1 G/1
1000 TABLET, FILM COATED ORAL 3 TIMES DAILY
Qty: 21 TABLET | Refills: 0 | Status: SHIPPED | OUTPATIENT
Start: 2024-01-10 | End: 2024-03-06

## 2024-01-10 RX ORDER — TIZANIDINE 2 MG/1
2 TABLET ORAL 3 TIMES DAILY PRN
COMMUNITY
Start: 2023-12-26

## 2024-01-10 NOTE — NURSING NOTE
Chief Complaint   Patient presents with    Rash     Rash on left side of abdomen area, unable to see anything but has been sore in this area for the last couple of days, now starting this morning woke up and when he was in the shower he felt something, does not have any pain with this but wondering if this could be shingles      Pre-visit Screening:  Immunizations:  up to date  Colonoscopy:  is up to date  Mammogram: stu  Asthma Action Test/Plan:  is due but will do at med check appt   PHQ9:  phq2 done today   GAD7:  na  Questioned patient about current smoking habits Pt. has never smoked.  Ok to leave detailed message on voice mail for today's visit only yes, phone # [phone] (home)

## 2024-01-10 NOTE — PROGRESS NOTES
Assessment & Plan       ICD-10-CM    1. Herpes zoster without complication  B02.9 valACYclovir (VALTREX) 1000 mg tablet         Mild shingles left flank. Reviewed nature, tx and precautions. Sx well controlled, if pain worsens could send in gabapentin. Reasons to follow-up or seek emergent care reviewed.       Talib Cameron MD, [facility]      Subjective     Dmitriy Saunders is a 58 year old male who presents to clinic today for the following health issues:    HPI   Chief Complaint   Patient presents with    Rash     Rash on left side of abdomen area, unable to see anything but has been sore in this area for the last couple of days, now starting this morning woke up and when he was in the shower he felt something, does not have any pain with this but wondering if this could be shingles       Left chest wall, can't see this area  Mild pain  No f/c/n/v, feels well generally  Hasn't had shingles vaccine           Objective    /78 (BP Location: Right arm, Patient Position: Sitting, Cuff Size: Adult Large)   Pulse 58   Temp 97.9  F (36.6  C) (Temporal)   Resp 20   Wt 95.7 kg (211 lb)   SpO2 99%   BMI 28.62 kg/m    Body mass index is 28.62 kg/m .  Alert, NAD  NC/AT  Sclerae anicteric  Regular  Resp nonlabored  Skin warm and dry, two clusers of erythematous vesicles L flank (~T7)  No focal neuro deficits. Speech intact. Normal gait.  Appropriate affect       Labs reviewed.

## 2024-02-05 ENCOUNTER — TRANSFERRED RECORDS (OUTPATIENT)
Dept: FAMILY MEDICINE | Facility: CLINIC | Age: 59
End: 2024-02-05

## 2024-02-07 ENCOUNTER — TRANSFERRED RECORDS (OUTPATIENT)
Dept: FAMILY MEDICINE | Facility: CLINIC | Age: 59
End: 2024-02-07

## 2024-03-06 ENCOUNTER — OFFICE VISIT (OUTPATIENT)
Dept: FAMILY MEDICINE | Facility: CLINIC | Age: 59
End: 2024-03-06

## 2024-03-06 VITALS
OXYGEN SATURATION: 98 % | DIASTOLIC BLOOD PRESSURE: 78 MMHG | SYSTOLIC BLOOD PRESSURE: 120 MMHG | BODY MASS INDEX: 28.89 KG/M2 | TEMPERATURE: 97.2 F | HEART RATE: 61 BPM | WEIGHT: 213 LBS

## 2024-03-06 DIAGNOSIS — M19.172 POST-TRAUMATIC OSTEOARTHRITIS OF LEFT ANKLE: ICD-10-CM

## 2024-03-06 DIAGNOSIS — Z01.818 PRE-OP EXAM: Primary | ICD-10-CM

## 2024-03-06 LAB
BUN SERPL-MCNC: 16 MG/DL (ref 7–25)
BUN/CREATININE RATIO: 12.2 (ref 6–32)
CALCIUM SERPL-MCNC: 9.8 MG/DL (ref 8.6–10.3)
CHLORIDE SERPLBLD-SCNC: 102 MMOL/L (ref 98–110)
CO2 SERPL-SCNC: 30.8 MMOL/L (ref 20–32)
CREAT SERPL-MCNC: 1.29 MG/DL (ref 0.6–1.3)
GLUCOSE SERPL-MCNC: 98 MG/DL (ref 60–99)
POTASSIUM SERPL-SCNC: 4.22 MMOL/L (ref 3.5–5.3)
SODIUM SERPL-SCNC: 140.5 MMOL/L (ref 135–146)

## 2024-03-06 PROCEDURE — 80048 BASIC METABOLIC PNL TOTAL CA: CPT | Performed by: PHYSICIAN ASSISTANT

## 2024-03-06 PROCEDURE — 36415 COLL VENOUS BLD VENIPUNCTURE: CPT | Performed by: PHYSICIAN ASSISTANT

## 2024-03-06 PROCEDURE — 99214 OFFICE O/P EST MOD 30 MIN: CPT | Performed by: PHYSICIAN ASSISTANT

## 2024-03-06 NOTE — NURSING NOTE
Chief Complaint   Patient presents with    Pre-Op Exam     Surgery/Procedure: Left ankle arthroscopy   Surgery Location: [facility]  Surgeon: Dr. Corado  Surgery Date: 3/19/2024

## 2024-03-06 NOTE — PROGRESS NOTES
Preoperative Evaluation  [facility]  [address] [place] [address]  [address]  [facility] [address]  Phone: [phone]  Fax: [phone]  Primary Provider: Melissa Way  Pre-op Performing Provider: KYREE SHER  Mar 6, 2024       Raul is a 58 year old, presenting for the following:  Pre-Op Exam (Surgery/Procedure: Left ankle arthroscopy /Surgery Location: [facility]/Surgeon: Dr. Corado/Surgery Date: 3/19/2024/)      Surgical Information  Surgery/Procedure: Left ankle arthroscopy   Surgery Location: [facility]  Surgeon: Dr. Corado  Surgery Date: 3/19/2024  Time of Surgery: TBD  Where patient plans to recover: At home with family  Fax number for surgical facility: [phone]    Assessment & Plan     The proposed surgical procedure is considered INTERMEDIATE risk.    Pre-op exam  Proceed with surgery at surgeon's discretion.    - VENOUS COLLECTION  - Basic Metabolic Panel (BFP)    Post-traumatic osteoarthritis of left ankle    - VENOUS COLLECTION  - Basic Metabolic Panel (BFP)           - No identified additional risk factors other than previously addressed    Antiplatelet or Anticoagulation Medication Instructions   - Patient is on no antiplatelet or anticoagulation medications.    Additional Medication Instructions  Hold all meds until after surgery    Recommendation  APPROVAL GIVEN to proceed with proposed procedure, without further diagnostic evaluation.          Subjective       HPI related to upcoming procedure: L ankle fracture in the past, scope and debridement   1. No - Have you ever had a heart attack or stroke?  2. No - Have you ever had surgery on your heart or blood vessels, such as a stent, coronary (heart) bypass, or surgery on an artery in the head, neck, heart, or legs?  3. No - Do you have chest pain when you are physically active?  4. No - Do you have a history of heart failure?  5. No - Do you currently have a cold, bronchitis, or symptoms of  other respiratory (head and chest) infections?  6. No - Do you have a cough, shortness of breath, or wheezing?  7. No - Do you or anyone in your family have a history of blood clots?  8. No - Do you or anyone in your family have a serious bleeding problem, such as long-lasting bleeding after surgeries or cuts?  9. No - Have you ever had anemia or been told to take iron pills?  10. No - Have you had any abnormal blood loss such as black, tarry or bloody stools, or abnormal vaginal bleeding?  11. No - Have you ever had a blood transfusion?  12. Yes - Are you willing to have a blood transfusion if it is medically needed before, during, or after your surgery?  13. No - Have you or anyone in your family ever had problems with anesthesia (sedation for surgery)?  14. No - Do you have sleep apnea, excessive snoring, or daytime drowsiness?   15. No - Do you have any artifical heart valves or other implanted medical devices, such as a pacemaker, defibrillator, or continuous glucose monitor?  16. No - Do you have any artifical joints?  17. No - Are you allergic to latex?    Health Care Directive  Patient does not have a Health Care Directive or Living Will: Discussed advance care planning with patient; however, patient declined at this time.    Preoperative Review of    reviewed - controlled substances prescribed by other outside provider(s).      Status of Chronic Conditions:  See problem list for active medical problems.  Problems all longstanding and stable, except as noted/documented.  See ROS for pertinent symptoms related to these conditions.    Patient Active Problem List    Diagnosis Date Noted    Non-celiac gluten sensitivity 05/05/2022     Priority: Medium    Asthma 10/27/2021     Priority: Medium    Pulmonary nodules Dec 2016 CT - see scanned doc 05/25/2017     Priority: Medium    ACP (advance care planning) 07/26/2016     Priority: Medium     Advance Care Planning 7/26/2016: ACP Review of Chart / Resources  Provided:  Reviewed chart for advance care plan.  Dmitriy RONEY Saunders has no plan or code status on file. Discussed available resources and provided with information. Confirmed code status reflects current choices pending further ACP discussions.  Confirmed/documented legally designated decision makers.  Added by [facility] 10/21/2015     Priority: Medium    Eczema 07/22/2014     Priority: Medium    Seasonal allergies 03/20/2013     Priority: Medium    Gastritis 04/18/2007     Priority: Medium      Past Medical History:   Diagnosis Date    Asthma     Eczema     Seasonal allergic rhinitis      Past Surgical History:   Procedure Laterality Date    colonoscopy  2017    repeat 2027    ENT SURGERY      ORTHOPEDIC SURGERY       Current Outpatient Medications   Medication Sig Dispense Refill    albuterol (PROAIR HFA/PROVENTIL HFA/VENTOLIN HFA) 108 (90 Base) MCG/ACT inhaler Inhale 2 puffs into the lungs every 6 hours 18 g 1    cyclobenzaprine (FLEXERIL) 10 MG tablet Take 10 mg by mouth      gabapentin (NEURONTIN) 300 MG capsule Take 2 capsules (600 mg) by mouth 2 times daily      LANsoprazole (PREVACID) 30 MG DR capsule Take 15 mg by mouth      Omega-3 Fatty Acids (FISH OIL) 500 MG CAPS       Probiotic Product (PROBIOTIC-10 PO) FROM [employer]      psyllium (METAMUCIL/KONSYL) Packet Take 1 packet by mouth daily      tiZANidine (ZANAFLEX) 2 MG tablet Take 2 mg by mouth 3 times daily as needed for muscle spasms         Allergies   Allergen Reactions    Erythromycin         Social History     Tobacco Use    Smoking status: Never     Passive exposure: Never    Smokeless tobacco: Never   Substance Use Topics    Alcohol use: No       History   Drug Use No             Review of Systems  Constitutional, neuro, ENT, endocrine, pulmonary, cardiac, gastrointestinal, genitourinary, musculoskeletal, integument and psychiatric systems are negative, except as otherwise noted.    Objective    /78 (BP  Location: Right arm, Patient Position: Sitting, Cuff Size: Adult Large)   Pulse 61   Temp 97.2  F (36.2  C) (Temporal)   Wt 96.6 kg (213 lb)   SpO2 98%   BMI 28.89 kg/m     Estimated body mass index is 28.89 kg/m  as calculated from the following:    Height as of 11/2/23: 1.829 m (6').    Weight as of this encounter: 96.6 kg (213 lb).  Physical Exam  GENERAL: alert and no distress  EYES: Eyes grossly normal to inspection, PERRL and conjunctivae and sclerae normal  HENT: ear canals and TM's normal, nose and mouth without ulcers or lesions  NECK: no adenopathy, no asymmetry, masses, or scars  RESP: lungs clear to auscultation - no rales, rhonchi or wheezes  CV: regular rate and rhythm, normal S1 S2, no S3 or S4, no murmur, click or rub, no peripheral edema  ABDOMEN: soft, nontender, no hepatosplenomegaly, no masses and bowel sounds normal  NEURO: Normal strength and tone, mentation intact and speech normal  PSYCH: mentation appears normal, affect normal/bright    Recent Labs   Lab Test 10/28/23  1952   HGB 14.0         POTASSIUM 4.2   CR 1.29*        Diagnostics  Recent Results (from the past 48 hour(s))   Basic Metabolic Panel (BFP)    Collection Time: 03/06/24  4:10 PM   Result Value Ref Range    Carbon Dioxide 30.8 20 - 32 mmol/L    Creatinine 1.29 0.60 - 1.30 mg/dL    Glucose 98 60 - 99 mg/dL    Sodium 140.5 135 - 146 mmol/L    Potassium 4.22 3.5 - 5.3 mmol/L    Chloride 102.0 98 - 110 mmol/L    Urea Nitrogen 16 7 - 25 mg/dL    Calcium 9.8 8.6 - 10.3 mg/dL    BUN/Creatinine Ratio 12.2 6 - 32      No EKG required, no history of coronary heart disease, significant arrhythmia, peripheral arterial disease or other structural heart disease.    Revised Cardiac Risk Index (RCRI)  The patient has the following serious cardiovascular risks for perioperative complications:   - No serious cardiac risks = 0 points     RCRI Interpretation: 0 points: Class I (very low risk - 0.4% complication rate)          Signed Electronically by: Eladio Navarrete PA-C  Copy of this evaluation report is provided to requesting physician.

## 2024-03-15 ENCOUNTER — TRANSFERRED RECORDS (OUTPATIENT)
Dept: FAMILY MEDICINE | Facility: CLINIC | Age: 59
End: 2024-03-15

## 2024-03-20 ENCOUNTER — TRANSFERRED RECORDS (OUTPATIENT)
Dept: FAMILY MEDICINE | Facility: CLINIC | Age: 59
End: 2024-03-20

## 2024-04-04 ENCOUNTER — TRANSFERRED RECORDS (OUTPATIENT)
Dept: FAMILY MEDICINE | Facility: CLINIC | Age: 59
End: 2024-04-04

## 2024-04-26 ENCOUNTER — TRANSFERRED RECORDS (OUTPATIENT)
Dept: FAMILY MEDICINE | Facility: CLINIC | Age: 59
End: 2024-04-26

## 2024-05-17 ENCOUNTER — TRANSFERRED RECORDS (OUTPATIENT)
Dept: FAMILY MEDICINE | Facility: CLINIC | Age: 59
End: 2024-05-17

## 2024-06-01 ENCOUNTER — HEALTH MAINTENANCE LETTER (OUTPATIENT)
Age: 59
End: 2024-06-01

## 2024-06-21 ENCOUNTER — TRANSFERRED RECORDS (OUTPATIENT)
Dept: FAMILY MEDICINE | Facility: CLINIC | Age: 59
End: 2024-06-21

## 2024-07-26 ENCOUNTER — TRANSFERRED RECORDS (OUTPATIENT)
Dept: FAMILY MEDICINE | Facility: CLINIC | Age: 59
End: 2024-07-26

## 2024-09-18 ENCOUNTER — TRANSFERRED RECORDS (OUTPATIENT)
Dept: FAMILY MEDICINE | Facility: CLINIC | Age: 59
End: 2024-09-18

## 2024-10-22 ENCOUNTER — TRANSFERRED RECORDS (OUTPATIENT)
Dept: FAMILY MEDICINE | Facility: CLINIC | Age: 59
End: 2024-10-22

## 2025-02-25 ENCOUNTER — TRANSFERRED RECORDS (OUTPATIENT)
Dept: FAMILY MEDICINE | Facility: CLINIC | Age: 60
End: 2025-02-25

## 2025-04-09 ENCOUNTER — TRANSFERRED RECORDS (OUTPATIENT)
Dept: FAMILY MEDICINE | Facility: CLINIC | Age: 60
End: 2025-04-09

## 2025-05-05 ENCOUNTER — TRANSFERRED RECORDS (OUTPATIENT)
Dept: FAMILY MEDICINE | Facility: CLINIC | Age: 60
End: 2025-05-05

## 2025-05-07 ENCOUNTER — TRANSFERRED RECORDS (OUTPATIENT)
Dept: FAMILY MEDICINE | Facility: CLINIC | Age: 60
End: 2025-05-07

## 2025-06-14 ENCOUNTER — HEALTH MAINTENANCE LETTER (OUTPATIENT)
Age: 60
End: 2025-06-14

## 2025-06-18 ENCOUNTER — TRANSFERRED RECORDS (OUTPATIENT)
Dept: FAMILY MEDICINE | Facility: CLINIC | Age: 60
End: 2025-06-18